# Patient Record
Sex: FEMALE | Race: WHITE | NOT HISPANIC OR LATINO | Employment: UNEMPLOYED | URBAN - METROPOLITAN AREA
[De-identification: names, ages, dates, MRNs, and addresses within clinical notes are randomized per-mention and may not be internally consistent; named-entity substitution may affect disease eponyms.]

---

## 2023-04-04 ENCOUNTER — HOSPITAL ENCOUNTER (OUTPATIENT)
Dept: DATA CONVERSION | Facility: HOSPITAL | Age: 58
End: 2023-04-04
Attending: STUDENT IN AN ORGANIZED HEALTH CARE EDUCATION/TRAINING PROGRAM | Admitting: STUDENT IN AN ORGANIZED HEALTH CARE EDUCATION/TRAINING PROGRAM
Payer: COMMERCIAL

## 2023-04-04 DIAGNOSIS — K29.70 GASTRITIS, UNSPECIFIED, WITHOUT BLEEDING: ICD-10-CM

## 2023-04-04 DIAGNOSIS — E78.5 HYPERLIPIDEMIA, UNSPECIFIED: ICD-10-CM

## 2023-04-04 DIAGNOSIS — E66.9 OBESITY, UNSPECIFIED: ICD-10-CM

## 2023-04-04 DIAGNOSIS — R12 HEARTBURN: ICD-10-CM

## 2023-04-04 DIAGNOSIS — E03.9 HYPOTHYROIDISM, UNSPECIFIED: ICD-10-CM

## 2023-04-04 DIAGNOSIS — F32.A DEPRESSION, UNSPECIFIED: ICD-10-CM

## 2023-04-04 DIAGNOSIS — K44.9 DIAPHRAGMATIC HERNIA WITHOUT OBSTRUCTION OR GANGRENE: ICD-10-CM

## 2023-04-04 DIAGNOSIS — N80.9 ENDOMETRIOSIS, UNSPECIFIED: ICD-10-CM

## 2023-04-04 DIAGNOSIS — K31.9 DISEASE OF STOMACH AND DUODENUM, UNSPECIFIED: ICD-10-CM

## 2023-04-04 DIAGNOSIS — K21.9 GASTRO-ESOPHAGEAL REFLUX DISEASE WITHOUT ESOPHAGITIS: ICD-10-CM

## 2023-04-04 DIAGNOSIS — R13.10 DYSPHAGIA, UNSPECIFIED: ICD-10-CM

## 2023-04-04 DIAGNOSIS — K31.7 POLYP OF STOMACH AND DUODENUM: ICD-10-CM

## 2023-04-04 DIAGNOSIS — Q39.9 CONGENITAL MALFORMATION OF ESOPHAGUS, UNSPECIFIED: ICD-10-CM

## 2023-04-11 LAB
COMPLETE PATHOLOGY REPORT: NORMAL
CONVERTED CLINICAL DIAGNOSIS-HISTORY: NORMAL
CONVERTED FINAL DIAGNOSIS: NORMAL
CONVERTED FINAL REPORT PDF LINK TO COPY AND PASTE: NORMAL
CONVERTED GROSS DESCRIPTION: NORMAL

## 2023-04-19 ENCOUNTER — TELEPHONE (OUTPATIENT)
Dept: PRIMARY CARE | Facility: CLINIC | Age: 58
End: 2023-04-19
Payer: COMMERCIAL

## 2023-04-19 DIAGNOSIS — Z12.31 BREAST CANCER SCREENING BY MAMMOGRAM: Primary | ICD-10-CM

## 2023-04-19 NOTE — TELEPHONE ENCOUNTER
Patient requesting Mammogram order to be faxed to [facility] they will not schedule her until order is received [phone]

## 2023-05-03 DIAGNOSIS — E78.5 HYPERLIPIDEMIA, UNSPECIFIED: ICD-10-CM

## 2023-05-03 RX ORDER — ATORVASTATIN CALCIUM 40 MG/1
TABLET, FILM COATED ORAL
Qty: 90 TABLET | Refills: 3 | Status: SHIPPED | OUTPATIENT
Start: 2023-05-03 | End: 2024-04-15

## 2023-07-10 LAB
CALCIDIOL (25 OH VITAMIN D3) (NG/ML) IN SER/PLAS: 54 NG/ML
THYROTROPIN (MIU/L) IN SER/PLAS BY DETECTION LIMIT <= 0.05 MIU/L: 0.05 MIU/L (ref 0.44–3.98)
THYROXINE (T4) FREE (NG/DL) IN SER/PLAS: 1.07 NG/DL (ref 0.61–1.12)

## 2023-08-09 LAB
ALANINE AMINOTRANSFERASE (SGPT) (U/L) IN SER/PLAS: 12 U/L (ref 7–45)
ALBUMIN (G/DL) IN SER/PLAS: 4.4 G/DL (ref 3.4–5)
ALKALINE PHOSPHATASE (U/L) IN SER/PLAS: 100 U/L (ref 33–110)
ANION GAP IN SER/PLAS: 11 MMOL/L (ref 10–20)
ASPARTATE AMINOTRANSFERASE (SGOT) (U/L) IN SER/PLAS: 16 U/L (ref 9–39)
BILIRUBIN TOTAL (MG/DL) IN SER/PLAS: 0.4 MG/DL (ref 0–1.2)
CALCIUM (MG/DL) IN SER/PLAS: 9.7 MG/DL (ref 8.6–10.3)
CARBON DIOXIDE, TOTAL (MMOL/L) IN SER/PLAS: 28 MMOL/L (ref 21–32)
CHLORIDE (MMOL/L) IN SER/PLAS: 106 MMOL/L (ref 98–107)
CREATININE (MG/DL) IN SER/PLAS: 0.87 MG/DL (ref 0.5–1.05)
GFR FEMALE: 78 ML/MIN/1.73M2
GLUCOSE (MG/DL) IN SER/PLAS: 103 MG/DL (ref 74–99)
POTASSIUM (MMOL/L) IN SER/PLAS: 4.1 MMOL/L (ref 3.5–5.3)
PROTEIN TOTAL: 7.2 G/DL (ref 6.4–8.2)
SODIUM (MMOL/L) IN SER/PLAS: 141 MMOL/L (ref 136–145)
UREA NITROGEN (MG/DL) IN SER/PLAS: 20 MG/DL (ref 6–23)

## 2023-09-06 VITALS — BODY MASS INDEX: 35.64 KG/M2 | WEIGHT: 227.07 LBS | HEIGHT: 67 IN

## 2023-11-05 PROBLEM — F33.42 MAJOR DEPRESSIVE DISORDER, RECURRENT, IN FULL REMISSION WITH ANXIOUS DISTRESS (CMS-HCC): Status: ACTIVE | Noted: 2023-11-05

## 2023-11-05 PROBLEM — J32.9 SINUSITIS: Status: ACTIVE | Noted: 2023-11-05

## 2023-11-05 PROBLEM — H52.4 BILATERAL PRESBYOPIA: Status: ACTIVE | Noted: 2023-11-05

## 2023-11-05 PROBLEM — L03.90 CELLULITIS: Status: ACTIVE | Noted: 2023-11-05

## 2023-11-05 PROBLEM — G51.4 EYELID MYOKYMIA: Status: ACTIVE | Noted: 2023-11-05

## 2023-11-05 PROBLEM — G89.29 CHRONIC MUSCULOSKELETAL PAIN: Status: ACTIVE | Noted: 2023-11-05

## 2023-11-05 PROBLEM — M19.90 OSTEOARTHRITIS: Status: ACTIVE | Noted: 2023-11-05

## 2023-11-05 PROBLEM — H66.90 OTITIS MEDIA: Status: ACTIVE | Noted: 2023-11-05

## 2023-11-05 PROBLEM — L68.0 HIRSUTISM: Status: ACTIVE | Noted: 2020-01-13

## 2023-11-05 PROBLEM — G43.909 MIGRAINE HEADACHE: Status: ACTIVE | Noted: 2023-11-05

## 2023-11-05 PROBLEM — R53.83 FATIGUE: Status: ACTIVE | Noted: 2023-11-05

## 2023-11-05 PROBLEM — G47.30 SLEEP APNEA: Status: ACTIVE | Noted: 2023-11-05

## 2023-11-05 PROBLEM — R20.2 NUMBNESS AND TINGLING: Status: ACTIVE | Noted: 2023-11-05

## 2023-11-05 PROBLEM — F42.9 OBSESSIVE-COMPULSIVE DISORDER: Status: ACTIVE | Noted: 2023-11-05

## 2023-11-05 PROBLEM — K21.9 GASTROESOPHAGEAL REFLUX DISEASE: Status: ACTIVE | Noted: 2023-11-05

## 2023-11-05 PROBLEM — M79.10 MYALGIA: Status: ACTIVE | Noted: 2023-11-05

## 2023-11-05 PROBLEM — R20.0 NUMBNESS AND TINGLING: Status: ACTIVE | Noted: 2023-11-05

## 2023-11-05 PROBLEM — F33.1 MODERATE EPISODE OF RECURRENT MAJOR DEPRESSIVE DISORDER (MULTI): Status: ACTIVE | Noted: 2023-11-05

## 2023-11-05 PROBLEM — E03.9 HYPOTHYROIDISM: Status: ACTIVE | Noted: 2023-11-05

## 2023-11-05 PROBLEM — H52.03 HYPERMETROPIA OF BOTH EYES: Status: ACTIVE | Noted: 2023-11-05

## 2023-11-05 PROBLEM — M19.049 CMC ARTHRITIS: Status: ACTIVE | Noted: 2023-11-05

## 2023-11-05 PROBLEM — E78.5 HYPERLIPIDEMIA: Status: ACTIVE | Noted: 2023-11-05

## 2023-11-05 PROBLEM — R06.83 SNORES: Status: ACTIVE | Noted: 2023-11-05

## 2023-11-05 PROBLEM — E66.9 OBESITY: Status: ACTIVE | Noted: 2023-11-05

## 2023-11-05 PROBLEM — L21.8 OTHER SEBORRHEIC DERMATITIS: Status: ACTIVE | Noted: 2020-01-13

## 2023-11-05 PROBLEM — N95.1 VASOMOTOR SYMPTOMS DUE TO MENOPAUSE: Status: ACTIVE | Noted: 2023-11-05

## 2023-11-05 PROBLEM — G25.2 DYSTONIC TREMOR: Status: ACTIVE | Noted: 2023-11-05

## 2023-11-05 PROBLEM — R25.1 TREMOR: Status: ACTIVE | Noted: 2023-11-05

## 2023-11-05 PROBLEM — M79.7 FIBROMYALGIA: Status: ACTIVE | Noted: 2023-11-05

## 2023-11-05 PROBLEM — E53.8 B12 DEFICIENCY: Status: ACTIVE | Noted: 2023-11-05

## 2023-11-05 PROBLEM — H91.91 HEARING LOSS ON RIGHT: Status: ACTIVE | Noted: 2023-11-05

## 2023-11-05 PROBLEM — G43.719 INTRACTABLE CHRONIC MIGRAINE WITHOUT AURA AND WITHOUT STATUS MIGRAINOSUS: Status: ACTIVE | Noted: 2023-11-05

## 2023-11-05 PROBLEM — H57.812 BROW PTOSIS, LEFT: Status: ACTIVE | Noted: 2023-11-05

## 2023-11-05 PROBLEM — D35.2 PITUITARY ADENOMA (MULTI): Status: ACTIVE | Noted: 2023-11-05

## 2023-11-05 PROBLEM — F33.8 SEASONAL AFFECTIVE DISORDER (CMS-HCC): Status: ACTIVE | Noted: 2023-11-05

## 2023-11-05 PROBLEM — M12.9 ARTHROPATHY: Status: ACTIVE | Noted: 2023-11-05

## 2023-11-05 PROBLEM — R06.09 DYSPNEA ON EXERTION: Status: ACTIVE | Noted: 2023-11-05

## 2023-11-05 PROBLEM — E55.9 VITAMIN D DEFICIENCY: Status: ACTIVE | Noted: 2023-11-05

## 2023-11-05 PROBLEM — R21 RASH: Status: ACTIVE | Noted: 2023-11-05

## 2023-11-05 PROBLEM — F41.8 ANXIETY WITH SOMATIC FEATURES: Status: ACTIVE | Noted: 2023-11-05

## 2023-11-05 PROBLEM — M79.18 CHRONIC MUSCULOSKELETAL PAIN: Status: ACTIVE | Noted: 2023-11-05

## 2023-11-05 RX ORDER — ASPIRIN 325 MG
1 TABLET, DELAYED RELEASE (ENTERIC COATED) ORAL WEEKLY
COMMUNITY
Start: 2022-02-01 | End: 2023-11-17 | Stop reason: ALTCHOICE

## 2023-11-05 RX ORDER — CICLOPIROX 1 G/100ML
1 SHAMPOO TOPICAL
COMMUNITY
Start: 2018-04-09 | End: 2023-11-17 | Stop reason: ALTCHOICE

## 2023-11-05 RX ORDER — UBROGEPANT 100 MG/1
1 TABLET ORAL ONCE AS NEEDED
COMMUNITY
Start: 2023-03-06 | End: 2023-11-17 | Stop reason: ALTCHOICE

## 2023-11-05 RX ORDER — CEFDINIR 300 MG/1
1 CAPSULE ORAL 2 TIMES DAILY
COMMUNITY
Start: 2022-11-08 | End: 2023-11-17 | Stop reason: ALTCHOICE

## 2023-11-05 RX ORDER — LAMOTRIGINE 200 MG/1
1 TABLET ORAL DAILY
COMMUNITY
Start: 2023-05-02 | End: 2024-02-21 | Stop reason: SDUPTHER

## 2023-11-05 RX ORDER — CEFUROXIME AXETIL 250 MG/1
1 TABLET ORAL DAILY
COMMUNITY
Start: 2018-04-09 | End: 2023-11-17 | Stop reason: ALTCHOICE

## 2023-11-05 RX ORDER — METHYLPREDNISOLONE 4 MG/1
TABLET ORAL
COMMUNITY
Start: 2022-09-09 | End: 2023-11-17 | Stop reason: ALTCHOICE

## 2023-11-05 RX ORDER — SERTRALINE HYDROCHLORIDE 100 MG/1
2 TABLET, FILM COATED ORAL DAILY
COMMUNITY
End: 2024-02-21 | Stop reason: SDUPTHER

## 2023-11-05 RX ORDER — ZOLMITRIPTAN 5 MG/1
1 TABLET, ORALLY DISINTEGRATING ORAL ONCE AS NEEDED
COMMUNITY
Start: 2022-08-17 | End: 2023-11-17 | Stop reason: ALTCHOICE

## 2023-11-05 RX ORDER — LEVOTHYROXINE SODIUM 137 UG/1
1 TABLET ORAL DAILY
COMMUNITY
Start: 2021-06-22 | End: 2024-01-23 | Stop reason: SDUPTHER

## 2023-11-05 RX ORDER — MELOXICAM 15 MG/1
1 TABLET ORAL DAILY PRN
COMMUNITY
Start: 2022-04-07 | End: 2023-11-20

## 2023-11-05 RX ORDER — OMEPRAZOLE 20 MG/1
1 CAPSULE, DELAYED RELEASE ORAL DAILY
COMMUNITY

## 2023-11-05 RX ORDER — PROPRANOLOL HYDROCHLORIDE 60 MG/1
1 CAPSULE, EXTENDED RELEASE ORAL DAILY
COMMUNITY
Start: 2023-02-14 | End: 2023-11-17 | Stop reason: ALTCHOICE

## 2023-11-06 ENCOUNTER — OFFICE VISIT (OUTPATIENT)
Dept: ORTHOPEDIC SURGERY | Facility: CLINIC | Age: 58
End: 2023-11-06
Payer: COMMERCIAL

## 2023-11-06 ENCOUNTER — SPECIALTY PHARMACY (OUTPATIENT)
Dept: PHARMACY | Facility: CLINIC | Age: 58
End: 2023-11-06

## 2023-11-06 ENCOUNTER — PHARMACY VISIT (OUTPATIENT)
Dept: PHARMACY | Facility: CLINIC | Age: 58
End: 2023-11-06
Payer: COMMERCIAL

## 2023-11-06 DIAGNOSIS — M19.049 CMC ARTHRITIS: Primary | ICD-10-CM

## 2023-11-06 PROCEDURE — 3008F BODY MASS INDEX DOCD: CPT | Performed by: ORTHOPAEDIC SURGERY

## 2023-11-06 PROCEDURE — 99203 OFFICE O/P NEW LOW 30 MIN: CPT | Performed by: ORTHOPAEDIC SURGERY

## 2023-11-06 PROCEDURE — 20600 DRAIN/INJ JOINT/BURSA W/O US: CPT | Performed by: ORTHOPAEDIC SURGERY

## 2023-11-06 PROCEDURE — RXMED WILLOW AMBULATORY MEDICATION CHARGE

## 2023-11-06 RX ORDER — LIDOCAINE HYDROCHLORIDE 10 MG/ML
0.5 INJECTION INFILTRATION; PERINEURAL
Status: COMPLETED | OUTPATIENT
Start: 2023-11-06 | End: 2023-11-06

## 2023-11-06 RX ORDER — TRIAMCINOLONE ACETONIDE 40 MG/ML
20 INJECTION, SUSPENSION INTRA-ARTICULAR; INTRAMUSCULAR
Status: COMPLETED | OUTPATIENT
Start: 2023-11-06 | End: 2023-11-06

## 2023-11-06 RX ADMIN — TRIAMCINOLONE ACETONIDE 20 MG: 40 INJECTION, SUSPENSION INTRA-ARTICULAR; INTRAMUSCULAR at 18:41

## 2023-11-06 RX ADMIN — LIDOCAINE HYDROCHLORIDE 0.5 ML: 10 INJECTION INFILTRATION; PERINEURAL at 18:41

## 2023-11-06 NOTE — PROGRESS NOTES
57-year-old retired right-hand-dominant female presents today for evaluation of bilateral thumb pain.  She has previously been treated by Dr. Tinsley in [place].  Work-up has included x-rays.  She was advised that he does not perform steroid injections for this problem and he recommended surgery.  He took her to the operating room in February 2022.  She was anticipating a thumb CMC arthroplasty procedure but indicates that after surgery she was advised that all that was done was an arthrotomy with removal of free-floating loose bodies from her arthritic left thumb CMC joint.  This did not change any of her symptoms.  She is additionally used Mobic, CBD, THC, topical pain relievers and splints.  She has not had any injections for this problem.  She is starting to have similar but much less severe symptoms on her right thumb as well.    Past Medical History:   Diagnosis Date    Bipolar II disorder (CMS/[facility]) 02/01/2017    Bipolar 2 disorder    Encounter for general adult medical examination without abnormal findings 10/19/2014    Preventative health care    Major depressive disorder, recurrent, in partial remission (CMS/[facility]) 02/17/2021    Major depressive disorder, recurrent episode, in partial remission with anxious distress    Major depressive disorder, recurrent, mild (CMS/[facility]) 02/01/2017    Major depressive disorder, recurrent episode, mild with anxious distress    Personal history of other mental and behavioral disorders 02/03/2017    History of depression    Personal history of other mental and behavioral disorders 02/01/2017    History of bipolar disorder    Personal history of other specified conditions     History of headache     Past Surgical History:   Procedure Laterality Date    OTHER SURGICAL HISTORY  09/12/2014    Incision Of Pituitary Gland Aspiration Rathke's Pouch    TOTAL VAGINAL HYSTERECTOMY  09/12/2014    Vaginal Hysterectomy     Current Outpatient Medications on File Prior to Visit   Medication Sig  Dispense Refill    atorvastatin (Lipitor) 40 mg tablet TAKE 1 TABLET BY MOUTH EVERYDAY AT BEDTIME 90 tablet 3    cefdinir (Omnicef) 300 mg capsule Take 1 capsule (300 mg) by mouth 2 times a day.      cefuroxime (Ceftin) 250 mg tablet Take 1 tablet (250 mg) by mouth once daily.      cholecalciferol (Vitamin D-3) 50,000 unit capsule Take 1 capsule (50,000 Units) by mouth once a week.      ciclopirox 1 % shampoo Apply 1 Application topically. As directed.      galcanezumab (Emgality) 120 mg/mL auto-injector INJECT 120MG (1 PEN) UNDER THE SKIN ONCE MONTHLY. 3 mL 3    lamoTRIgine (LaMICtal) 200 mg tablet Take 1 tablet (200 mg) by mouth once daily.      levothyroxine (Synthroid, Levoxyl) 137 mcg tablet Take 1 tablet (137 mcg) by mouth once daily.      meloxicam (Mobic) 15 mg tablet Take 1 tablet (15 mg) by mouth once daily as needed for mild pain (1 - 3). With food      methylPREDNISolone (Medrol Dospak) 4 mg tablets Take by mouth. TAKE PER DIRECTIONS      omeprazole (PriLOSEC) 20 mg DR capsule Take 1 capsule (20 mg) by mouth once daily.      onabotulinumtoxinA (Botox) 100 unit injection PROVIDER TO INJECT A TOTAL  UNITS INTRAMUSCULARLY ONCE EVERY 90 DAYS. 2 each 3    propranolol LA (Inderal LA) 60 mg 24 hr capsule Take 1 capsule (60 mg) by mouth once daily.      sertraline (Zoloft) 100 mg tablet Take 2 tablets (200 mg) by mouth once daily.      Ubrelvy 100 mg tablet tablet Take 1 tablet (100 mg) by mouth 1 time if needed.      ZOLMitriptan (Zomig-ZMT) 5 mg disintegrating tablet Take 1 tablet (5 mg) by mouth 1 time if needed for migraine.       No current facility-administered medications on file prior to visit.     Allergies   Allergen Reactions    Bee Pollen Itching     Physical Examination Findings:  Constitutional: Appears well-developed and well-nourished.  Head: Normocephalic and atraumatic.  Eyes: Pupils are equal and round.  Cardiovascular: Intact distal pulses.   Respiratory: Effort normal. No respiratory  distress.  Neurologic: Alert and oriented to person, place, and time.  Skin: Skin is warm and dry.  Hematologic / Lympahtic: No lymphedema, lymphangitis.  Psychiatric: normal mood and affect. Behavior is normal.   Musculoskeletal: Examination of the bilateral upper extremities reveals healed surgical incision extending proximally from the left thumb metacarpal base.  She has markedly positive CMC grind test bilaterally.  No MP joint hyperextension stability.  No thenar or intrinsic atrophy.  Sensation subjectively normal.    Review of x-rays of the bilateral thumbs from February 2020 to demonstrate arthritic changes at the CMC joint more significant on the left side.    Impression: Bilateral thumb CMC arthritis.    Plan: We have discussed this diagnosis.  She has clear advanced arthritic changes at her thumb CMC joints bilaterally.  I am not sure what was expected with a simple removal of loose body.  I believe steroid injections are an appropriate treatment option for this condition.  She has done all of the other attempted nonoperative management schemes without relief.  She has agreed to proceed with injections today.    S Inj/Asp: bilateral thumb CMC on 11/6/2023 6:41 PM  Indications: pain  Details: 25 G needle, dorsal approach  Medications (Right): 20 mg triamcinolone acetonide 40 mg/mL; 0.5 mL lidocaine 10 mg/mL (1 %)  Medications (Left): 20 mg triamcinolone acetonide 40 mg/mL; 0.5 mL lidocaine 10 mg/mL (1 %)  Outcome: tolerated well, no immediate complications  Procedure, treatment alternatives, risks and benefits explained, specific risks discussed. Consent was given by the patient. Immediately prior to procedure a time out was called to verify the correct patient, procedure, equipment, support staff and site/side marked as required. Patient was prepped and draped in the usual sterile fashion.         Return as needed for recurrence or progression of problems .    Sammy Felix MD  Associate    [facility] [employer]  Department of Orthopaedic Surgery  Chief of Hand and Upper Extremity Surgery  [facility]    Dictation performed with the use of voice recognition software. Syntax and grammatical errors may exist.

## 2023-11-06 NOTE — LETTER
November 6, 2023     Gertrude Wetzel MD PhD  [address]  [facility], [address]  [facility] [address]    Patient: Rachel Gomez   YOB: 1965   Date of Visit: 11/6/2023       Dear Dr. Gertrude Wetzel MD PhD:    Thank you for referring Rachel Gomez to me for evaluation. Below are my notes for this consultation.  If you have questions, please do not hesitate to call me. I look forward to following your patient along with you.       Sincerely,     Sammy Felix MD      CC: No Recipients  ______________________________________________________________________________________    57-year-old retired right-hand-dominant female presents today for evaluation of bilateral thumb pain.  She has previously been treated by Dr. Tinsley in [place].  Work-up has included x-rays.  She was advised that he does not perform steroid injections for this problem and he recommended surgery.  He took her to the operating room in February 2022.  She was anticipating a thumb CMC arthroplasty procedure but indicates that after surgery she was advised that all that was done was an arthrotomy with removal of free-floating loose bodies from her arthritic left thumb CMC joint.  This did not change any of her symptoms.  She is additionally used Mobic, CBD, THC, topical pain relievers and splints.  She has not had any injections for this problem.  She is starting to have similar but much less severe symptoms on her right thumb as well.    Past Medical History:   Diagnosis Date   • Bipolar II disorder (CMS/[facility]) 02/01/2017    Bipolar 2 disorder   • Encounter for general adult medical examination without abnormal findings 10/19/2014    Preventative health care   • Major depressive disorder, recurrent, in partial remission (CMS/[facility]) 02/17/2021    Major depressive disorder, recurrent episode, in partial remission with anxious distress   • Major depressive disorder, recurrent, mild (CMS/[facility]) 02/01/2017    Major  depressive disorder, recurrent episode, mild with anxious distress   • Personal history of other mental and behavioral disorders 02/03/2017    History of depression   • Personal history of other mental and behavioral disorders 02/01/2017    History of bipolar disorder   • Personal history of other specified conditions     History of headache     Past Surgical History:   Procedure Laterality Date   • OTHER SURGICAL HISTORY  09/12/2014    Incision Of Pituitary Gland Aspiration Rathke's Pouch   • TOTAL VAGINAL HYSTERECTOMY  09/12/2014    Vaginal Hysterectomy     Current Outpatient Medications on File Prior to Visit   Medication Sig Dispense Refill   • atorvastatin (Lipitor) 40 mg tablet TAKE 1 TABLET BY MOUTH EVERYDAY AT BEDTIME 90 tablet 3   • cefdinir (Omnicef) 300 mg capsule Take 1 capsule (300 mg) by mouth 2 times a day.     • cefuroxime (Ceftin) 250 mg tablet Take 1 tablet (250 mg) by mouth once daily.     • cholecalciferol (Vitamin D-3) 50,000 unit capsule Take 1 capsule (50,000 Units) by mouth once a week.     • ciclopirox 1 % shampoo Apply 1 Application topically. As directed.     • galcanezumab (Emgality) 120 mg/mL auto-injector INJECT 120MG (1 PEN) UNDER THE SKIN ONCE MONTHLY. 3 mL 3   • lamoTRIgine (LaMICtal) 200 mg tablet Take 1 tablet (200 mg) by mouth once daily.     • levothyroxine (Synthroid, Levoxyl) 137 mcg tablet Take 1 tablet (137 mcg) by mouth once daily.     • meloxicam (Mobic) 15 mg tablet Take 1 tablet (15 mg) by mouth once daily as needed for mild pain (1 - 3). With food     • methylPREDNISolone (Medrol Dospak) 4 mg tablets Take by mouth. TAKE PER DIRECTIONS     • omeprazole (PriLOSEC) 20 mg DR capsule Take 1 capsule (20 mg) by mouth once daily.     • onabotulinumtoxinA (Botox) 100 unit injection PROVIDER TO INJECT A TOTAL  UNITS INTRAMUSCULARLY ONCE EVERY 90 DAYS. 2 each 3   • propranolol LA (Inderal LA) 60 mg 24 hr capsule Take 1 capsule (60 mg) by mouth once daily.     • sertraline  (Zoloft) 100 mg tablet Take 2 tablets (200 mg) by mouth once daily.     • Ubrelvy 100 mg tablet tablet Take 1 tablet (100 mg) by mouth 1 time if needed.     • ZOLMitriptan (Zomig-ZMT) 5 mg disintegrating tablet Take 1 tablet (5 mg) by mouth 1 time if needed for migraine.       No current facility-administered medications on file prior to visit.     Allergies   Allergen Reactions   • Bee Pollen Itching     Physical Examination Findings:  Constitutional: Appears well-developed and well-nourished.  Head: Normocephalic and atraumatic.  Eyes: Pupils are equal and round.  Cardiovascular: Intact distal pulses.   Respiratory: Effort normal. No respiratory distress.  Neurologic: Alert and oriented to person, place, and time.  Skin: Skin is warm and dry.  Hematologic / Lympahtic: No lymphedema, lymphangitis.  Psychiatric: normal mood and affect. Behavior is normal.   Musculoskeletal: Examination of the bilateral upper extremities reveals healed surgical incision extending proximally from the left thumb metacarpal base.  She has markedly positive CMC grind test bilaterally.  No MP joint hyperextension stability.  No thenar or intrinsic atrophy.  Sensation subjectively normal.    Review of x-rays of the bilateral thumbs from February 2020 to demonstrate arthritic changes at the CMC joint more significant on the left side.    Impression: Bilateral thumb CMC arthritis.    Plan: We have discussed this diagnosis.  She has clear advanced arthritic changes at her thumb CMC joints bilaterally.  I am not sure what was expected with a simple removal of loose body.  I believe steroid injections are an appropriate treatment option for this condition.  She has done all of the other attempted nonoperative management schemes without relief.  She has agreed to proceed with injections today.    S Inj/Asp: bilateral thumb CMC on 11/6/2023 6:41 PM  Indications: pain  Details: 25 G needle, dorsal approach  Medications (Right): 20 mg triamcinolone  acetonide 40 mg/mL; 0.5 mL lidocaine 10 mg/mL (1 %)  Medications (Left): 20 mg triamcinolone acetonide 40 mg/mL; 0.5 mL lidocaine 10 mg/mL (1 %)  Outcome: tolerated well, no immediate complications  Procedure, treatment alternatives, risks and benefits explained, specific risks discussed. Consent was given by the patient. Immediately prior to procedure a time out was called to verify the correct patient, procedure, equipment, support staff and site/side marked as required. Patient was prepped and draped in the usual sterile fashion.         Return as needed for recurrence or progression of problems .    Sammy Felix MD    [facility] [employer]  Department of Orthopaedic Surgery  Chief of Hand and Upper Extremity Surgery  [facility]    Dictation performed with the use of voice recognition software. Syntax and grammatical errors may exist.

## 2023-11-08 ENCOUNTER — TELEPHONE (OUTPATIENT)
Dept: PRIMARY CARE | Facility: CLINIC | Age: 58
End: 2023-11-08

## 2023-11-08 ENCOUNTER — PROCEDURE VISIT (OUTPATIENT)
Dept: NEUROLOGY | Facility: CLINIC | Age: 58
End: 2023-11-08
Payer: COMMERCIAL

## 2023-11-08 DIAGNOSIS — G43.719 INTRACTABLE CHRONIC MIGRAINE WITHOUT AURA AND WITHOUT STATUS MIGRAINOSUS: Primary | ICD-10-CM

## 2023-11-08 PROCEDURE — 64615 CHEMODENERV MUSC MIGRAINE: CPT | Performed by: PSYCHIATRY & NEUROLOGY

## 2023-11-08 NOTE — PROGRESS NOTES
PROCEDURE NOTE:    58 y/o F here for repeat Botox injection for migraines.   Her headaches were worsening with weather changes and certain dietary changes.      Botox typically in the past lasts 2 1/2 months, only had a couple headaches during that time, the past 2 weeks has had more HAs, stronger. she is happy with the response.      used to be followed by Sarahy Morrison (NP) and Dr. Castro at [facility]     hx prolactinoma s/p TSS 10 years ago at [facility], sees endocrine.     Onset of headaches: 40+ years  Frequency of headaches (days per month): without botox - usually 20 days/ month  Duration of headaches (average): All day long.  Severity of headaches (out of 10): 5-7/10  Aura: none , wakes up with HA  Nausea/vomiting: Yes - nausea  Photophobia: Yes  Phonophobia: Yes     Location: Across forehead to temple area.     triggers:Weather changes, foods, not eating correctly     brain imaging: MRI performed regularly ([facility]) due to hx of pituitary tumor/removed surgically 2010 at [facility]. - her endocrinologist just ordered MRI sella.      Preventative medications:   Topiramate  Zonisamide  Buproprion  ubrelvy  emgality       Botox (4 rounds)- in 2018 with 200 units, last one being 12/4/2018- stopped due to insurance.   She has been to the ER in the past and received a headache cocktail.      Abortive medications: Dexamethasone 4 mg (during a cycle)    Zomig nasal spray.   Rizatriptan.      Personally reviewed with patient- accurate for below - Past Medical, Surgical and Family and Social histories        Procedure        The patient was prepped in the usual sterile fashion. OnabotulinumtoxinA (Botox) was injected as follows:     Total dose       Sites                      Muscle     1.       25 units  4 sites                   Frontalis muscle   2. 10 units        2 sites                    muscle  3.       5 units    1 site                     Procerus muscle  4.       40 units  6 sites                   Occipitalis  muscle  5.       55 units  6 sites                   Temporalis muscle (more anterior)   6.       30 units  6 sites                   Trapezius muscle  7.       20 units  4 sites                   Cervical paraspinal (bilateral)     Total amount of botulinum toxin used was 185 units.  BOTOX IS PATIENT SUPPLIED      Diagnosis: G43.719      The procedure was well tolerated. The patient will return in approximately three months for repeat injection.

## 2023-11-08 NOTE — TELEPHONE ENCOUNTER
Pt of Dr Randolph's. Calling in regards to an appt she has next week and was wanting to advise if she needs labs done. Please advise. Thank you!

## 2023-11-17 ENCOUNTER — OFFICE VISIT (OUTPATIENT)
Dept: RHEUMATOLOGY | Facility: CLINIC | Age: 58
End: 2023-11-17
Payer: COMMERCIAL

## 2023-11-17 ENCOUNTER — LAB (OUTPATIENT)
Dept: LAB | Facility: LAB | Age: 58
End: 2023-11-17
Payer: COMMERCIAL

## 2023-11-17 VITALS
DIASTOLIC BLOOD PRESSURE: 68 MMHG | SYSTOLIC BLOOD PRESSURE: 112 MMHG | BODY MASS INDEX: 36.63 KG/M2 | HEIGHT: 67 IN | WEIGHT: 233.4 LBS | TEMPERATURE: 97.4 F | HEART RATE: 62 BPM | OXYGEN SATURATION: 97 %

## 2023-11-17 DIAGNOSIS — R35.0 URINARY FREQUENCY: ICD-10-CM

## 2023-11-17 DIAGNOSIS — M19.90 UNSPECIFIED OSTEOARTHRITIS, UNSPECIFIED SITE: ICD-10-CM

## 2023-11-17 DIAGNOSIS — M19.049 CMC ARTHRITIS: ICD-10-CM

## 2023-11-17 DIAGNOSIS — M15.9 OSTEOARTHRITIS OF MULTIPLE JOINTS, UNSPECIFIED OSTEOARTHRITIS TYPE: Primary | ICD-10-CM

## 2023-11-17 PROBLEM — M54.12 RADICULOPATHY, CERVICAL REGION: Status: ACTIVE | Noted: 2018-04-23

## 2023-11-17 PROBLEM — R20.0 ANESTHESIA OF SKIN: Status: ACTIVE | Noted: 2018-03-14

## 2023-11-17 PROBLEM — J30.2 SEASONAL ALLERGIES: Status: ACTIVE | Noted: 2018-04-29

## 2023-11-17 PROBLEM — L72.0 EPIDERMOID CYST OF SKIN: Status: ACTIVE | Noted: 2018-04-23

## 2023-11-17 PROBLEM — L71.9 ROSACEA: Status: ACTIVE | Noted: 2018-04-23

## 2023-11-17 PROBLEM — I10 ESSENTIAL (PRIMARY) HYPERTENSION: Status: ACTIVE | Noted: 2018-04-23

## 2023-11-17 LAB
APPEARANCE UR: CLEAR
BACTERIA #/AREA URNS AUTO: ABNORMAL /HPF
BILIRUB UR STRIP.AUTO-MCNC: ABNORMAL MG/DL
CAOX CRY #/AREA UR COMP ASSIST: ABNORMAL /HPF
COLOR UR: ABNORMAL
GLUCOSE UR STRIP.AUTO-MCNC: NEGATIVE MG/DL
KETONES UR STRIP.AUTO-MCNC: ABNORMAL MG/DL
LEUKOCYTE ESTERASE UR QL STRIP.AUTO: ABNORMAL
MUCOUS THREADS #/AREA URNS AUTO: ABNORMAL /LPF
NITRITE UR QL STRIP.AUTO: NEGATIVE
PH UR STRIP.AUTO: 5 [PH]
PROT UR STRIP.AUTO-MCNC: ABNORMAL MG/DL
RBC # UR STRIP.AUTO: NEGATIVE /UL
RBC #/AREA URNS AUTO: ABNORMAL /HPF
SP GR UR STRIP.AUTO: 1.03
SQUAMOUS #/AREA URNS AUTO: ABNORMAL /HPF
UROBILINOGEN UR STRIP.AUTO-MCNC: 2 MG/DL
WBC #/AREA URNS AUTO: ABNORMAL /HPF

## 2023-11-17 PROCEDURE — 3008F BODY MASS INDEX DOCD: CPT | Performed by: INTERNAL MEDICINE

## 2023-11-17 PROCEDURE — 3074F SYST BP LT 130 MM HG: CPT | Performed by: INTERNAL MEDICINE

## 2023-11-17 PROCEDURE — 3078F DIAST BP <80 MM HG: CPT | Performed by: INTERNAL MEDICINE

## 2023-11-17 PROCEDURE — 81001 URINALYSIS AUTO W/SCOPE: CPT

## 2023-11-17 PROCEDURE — 1036F TOBACCO NON-USER: CPT | Performed by: INTERNAL MEDICINE

## 2023-11-17 PROCEDURE — 99214 OFFICE O/P EST MOD 30 MIN: CPT | Performed by: INTERNAL MEDICINE

## 2023-11-17 RX ORDER — ZONISAMIDE 100 MG/1
300 CAPSULE ORAL
COMMUNITY
Start: 2019-04-29 | End: 2023-11-17 | Stop reason: ALTCHOICE

## 2023-11-17 RX ORDER — KETOROLAC TROMETHAMINE 10 MG/1
10 TABLET, FILM COATED ORAL EVERY 6 HOURS PRN
COMMUNITY
Start: 2018-09-12 | End: 2023-11-17 | Stop reason: ALTCHOICE

## 2023-11-17 RX ORDER — MAGNESIUM 200 MG
1 TABLET ORAL
COMMUNITY
End: 2023-11-17 | Stop reason: ALTCHOICE

## 2023-11-17 RX ORDER — CHLORZOXAZONE 500 MG/1
500 TABLET ORAL 4 TIMES DAILY
COMMUNITY
Start: 2018-09-04 | End: 2023-11-17 | Stop reason: ALTCHOICE

## 2023-11-17 RX ORDER — TOPIRAMATE 100 MG/1
100 TABLET, FILM COATED ORAL DAILY
COMMUNITY
Start: 2015-05-21 | End: 2023-11-17 | Stop reason: ALTCHOICE

## 2023-11-17 RX ORDER — ERGOCALCIFEROL 1.25 MG/1
50000 CAPSULE ORAL
COMMUNITY
Start: 2020-03-19

## 2023-11-17 RX ORDER — ESTRADIOL 1 MG/1
1 TABLET ORAL
COMMUNITY
Start: 2019-05-06 | End: 2023-11-17 | Stop reason: ALTCHOICE

## 2023-11-17 ASSESSMENT — ENCOUNTER SYMPTOMS
OCCASIONAL FEELINGS OF UNSTEADINESS: 0
DEPRESSION: 0
LOSS OF SENSATION IN FEET: 0

## 2023-11-17 ASSESSMENT — PATIENT HEALTH QUESTIONNAIRE - PHQ9
SUM OF ALL RESPONSES TO PHQ9 QUESTIONS 1 AND 2: 0
2. FEELING DOWN, DEPRESSED OR HOPELESS: NOT AT ALL
1. LITTLE INTEREST OR PLEASURE IN DOING THINGS: NOT AT ALL

## 2023-11-17 NOTE — PROGRESS NOTES
Subjective   Patient ID: Rachel Gomez is a 57 y.o. female who presents for Follow-up.    HPI 57-year-old right-handed female here for follow-up regarding OA of the first CMC joint of both hands.    She has a history of OA of the first CMC joint of both hands, left greater than right.  She had surgery on the left thumb basilar joint February 2022 by Dr. Karan Tinsley.  Procedure consisted of arthrotomy, biopsy of joint capsule, removal 2 loose bodies, excision of osteophyte/bone spur/bone cyst.     She still has persistent pain in the area.  She started meloxicam 15 mg daily April 2022 because Advil was not working.  She also takes omeprazole 20 mg daily for GERD, which is currently well controlled.  She tried to wean the meloxicam, but pain worsened.  She does get some definite relief from the meloxicam.    She saw Dr. Felix for second opinion November 6, 2023.  He gave her a corticosteroid injection of both first CMC joints, which did give her some relief.  He told her she can do this every 3 months, but he is trying to defer surgery at present.    She was previously told she may need a second surgical procedure, which involves a tendon transposition, for the left first CMC joint.     She had EGD April 2023 which showed small to medium size hiatal hernia, tortuous esophagus, gastritis, multiple gastric polyps.  Biopsies did not show any evidence of Veloz's esophagus.     Labs 9/20: ALFONZO negative, rheumatoid factor negative, citrulline antibody negative, ESR 25, CRP 0.88 (normal less than 1), uric acid 4.8  Labs December 2021: TSH 0.38  Labs 2/22: Hemoglobin 11.7, hematocrit 37.6, WBC 5.5, platelets 203, CMP normal, 25-hydroxy vitamin D 27  Labs February 2023: ALFONZO negative, rheumatoid factor negative, Citrulline antibody negative, ESR 29, CRP 0.54, 25-hydroxy vitamin D 66, cholesterol 166, HDL 58, LDL 85, triglycerides 112  Labs July 2023: 25-hydroxy vitamin D 66  Labs August 2023: CMP normal except glucose  "103     Medical problem list:   -Migraine headaches   -Bipolar disorder    -Hypothyroidism   -Hyperlipidemia   - GERD     Medications: Reviewed as documented  Surgeries: Vaginal hysterectomy    Resection of pituitary macroadenoma 2012     Allergies: Reviewed as documented     Social history: .   Denies use of tobacco or alcohol.   Occupation: Homemaker.     Family history: Mother-arthritis    ROS:  General: Denies fevers or chills.  CV: Denies chest pain or palpitations.  Denies leg edema.  Lungs: Denies coughing or shortness of breath.  Skin: Denies rashes or nodules.  MS: Denies joint pain or joint swelling.   : She complains of urinary frequency.  She goes about 12 times during the day, and she needs to get up 2-3 times at night to urinate.  She does not drink much fluid in the evening.  She drinks 1 cup of caffeinated coffee in the morning.    Objective   /68   Pulse 62   Temp 36.3 °C (97.4 °F)   Ht 1.689 m (5' 6.5\")   Wt 106 kg (233 lb 6.4 oz)   SpO2 97%   BMI 37.11 kg/m²     Physical Exam  HEENT: PERRL, EOMI  Neck: Supple, no nodes.  CV: RRR, no MGR.  Lungs: Clear, no rales or wheezes.  Abdomen: Soft, nontender. No hepatosplenomegaly.  Extremities:  No cyanosis, clubbing, or edema.  MS: No synovitis.  Bony prominence of first CMC joint bilaterally, consistent with OA.  Skin: No rashes or nodules.      Assessment/Plan   Problem List Items Addressed This Visit             ICD-10-CM    CMC arthritis M19.049    Osteoarthritis - Primary M19.90       OA left 1st CMC - s/p surgical procedure February 2022 consisting of arthrotomy, biopsy of joint capsule, removal 2 loose bodies, excision of osteophyte/bone spur/bone cyst over the dorsal base of first metacarpal of the left thumb. She has had persistent pain since the procedure. She was not getting relief with Advil. Meloxicam does help.  She had injection of first CMC joint bilaterally November 6, 2023 by Dr. Felix, which also helped.  She has " been told these can be reinjected every 3 months .    GERD-well-controlled with omeprazole.    Urinary frequency-urinalysis ordered.  If this is unremarkable, I would consider treatment for hyperactive bladder.    Plan:  Check urinalysis. Consider treatment for hyperactive bladder.  Check labs 2/24: CMP.  Follow-up in 6 months.

## 2023-11-17 NOTE — PATIENT INSTRUCTIONS
Check urinalysis. Consider treatment for hyperactive bladder.  Check labs 2/24: CMP.  Follow-up in 6 months.

## 2023-11-19 DIAGNOSIS — N39.0 URINARY TRACT INFECTION WITHOUT HEMATURIA, SITE UNSPECIFIED: Primary | ICD-10-CM

## 2023-11-19 RX ORDER — NITROFURANTOIN 25; 75 MG/1; MG/1
100 CAPSULE ORAL 2 TIMES DAILY
Qty: 10 CAPSULE | Refills: 0 | Status: SHIPPED | OUTPATIENT
Start: 2023-11-19 | End: 2023-11-24

## 2023-11-20 RX ORDER — MELOXICAM 15 MG/1
15 TABLET ORAL DAILY PRN
Qty: 90 TABLET | Refills: 1 | Status: SHIPPED | OUTPATIENT
Start: 2023-11-20 | End: 2024-05-21

## 2023-11-28 ENCOUNTER — PHARMACY VISIT (OUTPATIENT)
Dept: PHARMACY | Facility: CLINIC | Age: 58
End: 2023-11-28
Payer: COMMERCIAL

## 2023-11-28 ENCOUNTER — SPECIALTY PHARMACY (OUTPATIENT)
Dept: PHARMACY | Facility: CLINIC | Age: 58
End: 2023-11-28

## 2023-11-28 PROCEDURE — RXMED WILLOW AMBULATORY MEDICATION CHARGE

## 2023-12-05 ENCOUNTER — OFFICE VISIT (OUTPATIENT)
Dept: OBSTETRICS AND GYNECOLOGY | Facility: CLINIC | Age: 58
End: 2023-12-05
Payer: COMMERCIAL

## 2023-12-05 VITALS
DIASTOLIC BLOOD PRESSURE: 76 MMHG | WEIGHT: 233 LBS | BODY MASS INDEX: 36.57 KG/M2 | SYSTOLIC BLOOD PRESSURE: 128 MMHG | HEIGHT: 67 IN

## 2023-12-05 DIAGNOSIS — Z01.419 WELL WOMAN EXAM: Primary | ICD-10-CM

## 2023-12-05 DIAGNOSIS — R39.9 UTI SYMPTOMS: ICD-10-CM

## 2023-12-05 PROCEDURE — 3078F DIAST BP <80 MM HG: CPT | Performed by: OBSTETRICS & GYNECOLOGY

## 2023-12-05 PROCEDURE — 1036F TOBACCO NON-USER: CPT | Performed by: OBSTETRICS & GYNECOLOGY

## 2023-12-05 PROCEDURE — 3074F SYST BP LT 130 MM HG: CPT | Performed by: OBSTETRICS & GYNECOLOGY

## 2023-12-05 PROCEDURE — 99396 PREV VISIT EST AGE 40-64: CPT | Performed by: OBSTETRICS & GYNECOLOGY

## 2023-12-05 PROCEDURE — 3008F BODY MASS INDEX DOCD: CPT | Performed by: OBSTETRICS & GYNECOLOGY

## 2023-12-05 PROCEDURE — 87086 URINE CULTURE/COLONY COUNT: CPT

## 2023-12-05 ASSESSMENT — ENCOUNTER SYMPTOMS
DYSURIA: 0
FATIGUE: 0
APPETITE CHANGE: 0
FLANK PAIN: 0
VOMITING: 0
CONSTIPATION: 0
CHILLS: 0
SLEEP DISTURBANCE: 0
BLOOD IN STOOL: 0
HEMATURIA: 0
ABDOMINAL PAIN: 0
FREQUENCY: 0
BACK PAIN: 0
FEVER: 0
NAUSEA: 0
UNEXPECTED WEIGHT CHANGE: 0
ABDOMINAL DISTENTION: 0
COLOR CHANGE: 0
DIARRHEA: 0
SHORTNESS OF BREATH: 0

## 2023-12-05 ASSESSMENT — PAIN SCALES - GENERAL: PAINLEVEL: 0-NO PAIN

## 2023-12-05 NOTE — PROGRESS NOTES
History Of Present Illness  Routine Gyn Exam  Rachle JOZEF Gomez here for routine WWE.  Pt is postmenopausal.  Denies spotting or bleeding.   S/p hysterectomy 15+ years ago.     Current complaints: UTI symptoms, recently treated and unsure if resolved; cont to have hot flashes.     New health issues or surgeries since last visit: hand surgery for arthritis .  Exercise: irregular.     Gynecologic History  Postmenopausal.  Sexually active: Yes with one male partner/ .  Last Pap: no longer needed d/t hysterectomy .   Last mammogram: .   Last Colonoscopy:  Cologuard .   Last DEXA: not yet ordered.     Obstetric History  OB History    Para Term  AB Living   3 3       3   SAB IAB Ectopic Multiple Live Births                  # Outcome Date GA Lbr N[place]or/2nd Weight Sex Delivery Anes PTL Lv   3 Para            2 Para            1 Para                 Past Medical History  She has a past medical history of Bipolar II disorder (CMS/[facility]) (2017), Encounter for general adult medical examination without abnormal findings (10/19/2014), Major depressive disorder, recurrent, in partial remission (CMS/[facility]) (2021), Major depressive disorder, recurrent, mild (CMS/[facility]) (2017), Personal history of other mental and behavioral disorders (2017), Personal history of other mental and behavioral disorders (2017), and Personal history of other specified conditions.    Surgical History  She has a past surgical history that includes Other surgical history (2014); Total vaginal hysterectomy (2014); and Hand surgery (Left, 2022).     Social History  She reports that she has never smoked. She has never been exposed to tobacco smoke. She has never used smokeless tobacco. She reports that she does not drink alcohol and does not use drugs.    Family History  Family History   Problem Relation Name Age of Onset    Hypertension Mother      Alzheimer's disease Father      No Known  "Problems Other          Allergies  Bee pollen    Review of Systems   Constitutional:  Negative for appetite change, chills, fatigue, fever and unexpected weight change.   Respiratory:  Negative for shortness of breath.    Cardiovascular:  Negative for chest pain.   Gastrointestinal:  Negative for abdominal distention, abdominal pain, blood in stool, constipation, diarrhea, nausea and vomiting.   Endocrine: Negative for cold intolerance and heat intolerance.   Genitourinary:  Negative for dyspareunia, dysuria, flank pain, frequency, genital sores, hematuria, menstrual problem, pelvic pain, urgency, vaginal bleeding, vaginal discharge and vaginal pain.   Musculoskeletal:  Negative for back pain.   Skin:  Negative for color change.   Psychiatric/Behavioral:  Negative for sleep disturbance.        /76   Ht 1.702 m (5' 7\")   Wt 106 kg (233 lb)   BMI 36.49 kg/m²      Physical Exam  Constitutional:       Appearance: Normal appearance.   HENT:      Head: Normocephalic and atraumatic.   Chest:   Breasts:     Right: Normal.      Left: Normal.   Abdominal:      General: Abdomen is flat.      Palpations: Abdomen is soft.      Tenderness: There is no abdominal tenderness.   Genitourinary:     General: Normal vulva.      Vagina: Normal.      Uterus: Absent.       Adnexa: Right adnexa normal and left adnexa normal.      Comments: Cervix and uterus surgically absent  Vaginal cuff wnl     Skin:     General: Skin is warm and dry.   Neurological:      Mental Status: She is alert and oriented to person, place, and time.   Psychiatric:         Mood and Affect: Mood normal.          Last Recorded Vitals  Blood pressure 128/76, height 1.702 m (5' 7\"), weight 106 kg (233 lb).         Assessment/Plan         Routine Well Woman Exam Today  Discussed diet and exercise.   Reviewed routine health screenings.   Pap: no longer needed d/t hysterectomy for benign reasons   Recommend annual mammograms. Pt has order and will schedule.        "           Mis Hall MD

## 2023-12-07 LAB — BACTERIA UR CULT: NORMAL

## 2023-12-12 ENCOUNTER — APPOINTMENT (OUTPATIENT)
Dept: BEHAVIORAL HEALTH | Facility: CLINIC | Age: 58
End: 2023-12-12
Payer: COMMERCIAL

## 2023-12-14 ENCOUNTER — TELEMEDICINE (OUTPATIENT)
Dept: BEHAVIORAL HEALTH | Facility: CLINIC | Age: 58
End: 2023-12-14
Payer: COMMERCIAL

## 2023-12-14 DIAGNOSIS — F41.8 ANXIETY WITH SOMATIC FEATURES: ICD-10-CM

## 2023-12-14 DIAGNOSIS — F33.1 MODERATE EPISODE OF RECURRENT MAJOR DEPRESSIVE DISORDER (MULTI): ICD-10-CM

## 2023-12-14 DIAGNOSIS — F42.8 OTHER OBSESSIVE-COMPULSIVE DISORDERS: ICD-10-CM

## 2023-12-14 PROCEDURE — 3008F BODY MASS INDEX DOCD: CPT | Performed by: CLINICAL NURSE SPECIALIST

## 2023-12-14 PROCEDURE — 1036F TOBACCO NON-USER: CPT | Performed by: CLINICAL NURSE SPECIALIST

## 2023-12-14 PROCEDURE — 99213 OFFICE O/P EST LOW 20 MIN: CPT | Performed by: CLINICAL NURSE SPECIALIST

## 2023-12-14 NOTE — PROGRESS NOTES
Outpatient Psychiatry      Subjective   Rachel Gomez, is a 57 y.o. female,  seen in follow-up.      Assessment/Plan   Tolerating and benefiting from current regimen. No indication for medication changes today.      Advised of potentially fatal rash (i.e. Saucedo-Julio Cesar Syndrome) in 1:10,000 individuals. Agrees to stop medication and seek medical attention immediately if rash or blistering of the mucosal surfaces develops; also discussed risk for benign drug-induced rash, and advised to cease medication and seek immediate medical attention to r/o SJS given its potential lethality. Further advised that the risk for this reaction increases if proper dose titration is not followed, and that after 2 days of non-adherence, re-titration is necessary.     Continue lamotrigine 200 mg PO daily- no refill needed today.    Continue sertraline 200 mg PO daily. -no refill needed today.    Can assist with referral for individual therapy if she becomes interested.         Follow-up in 3 months.     Risk Assessment:  Risk Assessment: Rachel Gomez is currently a low acute risk of suicide and self-harm due to no past suicide attempt(s) and not currently endorsing thoughts of suicide. Rachel Gomez is currently a low acute risk of violence and harm to others due to no past history of violence and not currently threatening others.  Suicidal Risk Factors:  and current psychiatric illness  Violence Risk Factors: none  Protective Factors: strong coping skills, sense of responsibility towards family, social support/connectedness, moral objections to suicide, positive family relationships, hopefulness/future orientation, and marriage/partnership      Reason for Visit:  Follow-up for medication management.     HPI:  Since her last visit,     Feeling well  Hand is feeling much better- saw new provider and started cortisone injections. Has been significantly better.   Migraines have also been better    Mood has been  "\"maybe a little bit better,\" definitely not worse.  Anxiety has gotten a little better- surprising bc holiday time is typically stressful    More motivated to do things around the house- getting things organized. Taking ~15 min/day rather than getting overwhelmed with trying to do it all in one day.    Denies suicidal ideation or a passive death wish.     No new medications or health problems. Started black cohosh a few days ago for hot flashes.       Current Psychiatric Medications:  Lamotrigine 200 mg PO daily  Sertraline 200 mg PO daily      Record Review: brief     Medical Review Of Systems:  Pertinent items are noted in HPI.    Psychiatric Review Of Systems:  As noted in HPI.        Objective   Mental Status Exam  General Appearance: Well groomed, appropriate eye contact  Attitude/Behavior: Cooperative  Motor: No psychomotor agitation or retardation, no tremor or other abnormal movements  Speech: Normal rate, volume, prosody  Gait/Station: Other:(comment) (not observed- virtual visit)  Mood: \"Mood a little better.\"  Affect: Constricted, Congruent with mood and topic of conversation  Thought Process: Linear, goal directed  Thought Associations: No loosening of associations  Thought Content: Normal, Other: (comment) (anxious themes r/t family dynamics, especially son)  Perception: No perceptual abnormalities noted  Sensorium: Alert and oriented to person, place, time and situation  Insight: Intact  Judgement: Intact  Cognition: Cognitively intact to conversational testing with respect to attention, orientation, fund of knowledge, recent and remote memory, and language    Vitals:  There were no vitals filed for this visit.    Labs:  not applicable      Dominique Hoang, APRN-CNP, APRN-CNS  "

## 2024-01-08 ENCOUNTER — LAB (OUTPATIENT)
Dept: LAB | Facility: LAB | Age: 59
End: 2024-01-08
Payer: COMMERCIAL

## 2024-01-08 DIAGNOSIS — D35.2 BENIGN NEOPLASM OF PITUITARY GLAND (MULTI): Primary | ICD-10-CM

## 2024-01-08 DIAGNOSIS — E55.9 VITAMIN D DEFICIENCY, UNSPECIFIED: ICD-10-CM

## 2024-01-08 LAB
25(OH)D3 SERPL-MCNC: 48 NG/ML (ref 30–100)
T4 FREE SERPL-MCNC: 1.45 NG/DL (ref 0.61–1.12)

## 2024-01-08 PROCEDURE — 82306 VITAMIN D 25 HYDROXY: CPT

## 2024-01-08 PROCEDURE — 36415 COLL VENOUS BLD VENIPUNCTURE: CPT

## 2024-01-08 PROCEDURE — 84439 ASSAY OF FREE THYROXINE: CPT

## 2024-01-10 ENCOUNTER — OFFICE VISIT (OUTPATIENT)
Dept: ENDOCRINOLOGY | Facility: CLINIC | Age: 59
End: 2024-01-10
Payer: COMMERCIAL

## 2024-01-10 VITALS
WEIGHT: 235 LBS | DIASTOLIC BLOOD PRESSURE: 85 MMHG | BODY MASS INDEX: 36.88 KG/M2 | HEIGHT: 67 IN | SYSTOLIC BLOOD PRESSURE: 126 MMHG

## 2024-01-10 DIAGNOSIS — E55.9 VITAMIN D DEFICIENCY: ICD-10-CM

## 2024-01-10 DIAGNOSIS — E03.9 HYPOTHYROIDISM, UNSPECIFIED TYPE: Primary | ICD-10-CM

## 2024-01-10 PROCEDURE — 3008F BODY MASS INDEX DOCD: CPT | Performed by: STUDENT IN AN ORGANIZED HEALTH CARE EDUCATION/TRAINING PROGRAM

## 2024-01-10 PROCEDURE — 1036F TOBACCO NON-USER: CPT | Performed by: STUDENT IN AN ORGANIZED HEALTH CARE EDUCATION/TRAINING PROGRAM

## 2024-01-10 PROCEDURE — 99214 OFFICE O/P EST MOD 30 MIN: CPT | Performed by: STUDENT IN AN ORGANIZED HEALTH CARE EDUCATION/TRAINING PROGRAM

## 2024-01-10 PROCEDURE — 3079F DIAST BP 80-89 MM HG: CPT | Performed by: STUDENT IN AN ORGANIZED HEALTH CARE EDUCATION/TRAINING PROGRAM

## 2024-01-10 PROCEDURE — 3074F SYST BP LT 130 MM HG: CPT | Performed by: STUDENT IN AN ORGANIZED HEALTH CARE EDUCATION/TRAINING PROGRAM

## 2024-01-10 ASSESSMENT — ENCOUNTER SYMPTOMS: CONSTITUTIONAL NEGATIVE: 1

## 2024-01-10 NOTE — PROGRESS NOTES
"Subjective   Patient ID: Rachel Gomez is a 58 y.o. female who presents for Hypothyroidism (PCP:Gross /Fatigue symptoms on levothyroxine /Synthroid SAMANTHA through synthroid direct pharmacy /Recent labs done 1/8/24/) and Vitamin D Deficiency (Recent labs done 1/8/24/Current: 50,000 vitamin D weekly )     HPI  The patient is a 55 yo female with PMH of pituitary macroadenoma s/p TSS 10 years ago at [facility] ( pathology unknown),secondary hypothyroidism . She present today for regular follow up.  She feels well but energy and mood remain on lower side.  She thinks the weather may be the reason      Pituitary adenoma history :  She followed with Endocrinology at [facility] , she previously followed with Dr. Rausch and Dr. cannon  Diagnosed with hypothyroidism after her TSS  She was started on LT4 replacement after her TSS      She had a hysterectomy 10 year ago for uterine prolapse , Ovaries were not removed.     Review of Systems   Constitutional: Negative.        Objective   Physical Exam  Constitutional:       Appearance: Normal appearance.   Cardiovascular:      Rate and Rhythm: Normal rate and regular rhythm.   Pulmonary:      Effort: Pulmonary effort is normal.      Breath sounds: Normal breath sounds.   Neurological:      Mental Status: She is alert.   Psychiatric:         Mood and Affect: Mood normal.      Visit Vitals  /85   Ht 1.702 m (5' 7\")   Wt 107 kg (235 lb)   BMI 36.81 kg/m²   OB Status Hysterectomy   Smoking Status Never   BSA 2.25 m²            Assessment/Plan       The patient is a 55 yo female with PMH of pituitary macroadenoma s/p TSS 10 years ago at [facility] ( pathology unknown) with secondary hypothyroidism .      -Pt with secondary hypothyroidism on replacement , biochemically and clinically euthyroid on Synthroid 137 mcg through  delivery. Discussed trying to reduce dose to 6.5 pills per week to reevaluate for improvement in hot flashes     -No symptoms of AI or DI , no other hormonal " deficiencies detected   -Migraine headaches , follows with neurology , on Botox and is on Emglaity.  -chronic GERD on PPI for ~ 10 years   - vitamin D deficiency finished vitamin D 50,000 IU weekly , now takes 10,000 international  once a week      RTC in 6 months with labs

## 2024-01-12 ENCOUNTER — TELEPHONE (OUTPATIENT)
Dept: NEUROLOGY | Facility: CLINIC | Age: 59
End: 2024-01-12
Payer: COMMERCIAL

## 2024-01-12 NOTE — TELEPHONE ENCOUNTER
Patient requesting medrol pack, states she has been having migraine longer than a week and nothing is helping it.    Please advise

## 2024-01-15 DIAGNOSIS — G43.719 INTRACTABLE CHRONIC MIGRAINE WITHOUT AURA AND WITHOUT STATUS MIGRAINOSUS: Primary | ICD-10-CM

## 2024-01-15 RX ORDER — METHYLPREDNISOLONE 4 MG/1
TABLET ORAL
Qty: 21 TABLET | Refills: 0 | Status: SHIPPED | OUTPATIENT
Start: 2024-01-15 | End: 2024-02-15 | Stop reason: ALTCHOICE

## 2024-01-23 ENCOUNTER — TELEPHONE (OUTPATIENT)
Dept: ENDOCRINOLOGY | Facility: CLINIC | Age: 59
End: 2024-01-23
Payer: COMMERCIAL

## 2024-01-23 ENCOUNTER — SPECIALTY PHARMACY (OUTPATIENT)
Dept: PHARMACY | Facility: CLINIC | Age: 59
End: 2024-01-23

## 2024-01-23 DIAGNOSIS — E03.9 HYPOTHYROIDISM, UNSPECIFIED TYPE: ICD-10-CM

## 2024-01-23 RX ORDER — LEVOTHYROXINE SODIUM 137 UG/1
137 TABLET ORAL DAILY
Qty: 90 TABLET | Refills: 1 | Status: SHIPPED | OUTPATIENT
Start: 2024-01-23

## 2024-01-23 NOTE — TELEPHONE ENCOUNTER
Rachel called 1-23-24 asking for a refill of her Synthroid 137 mcg (SAMANTHA)  To to her [facility] mail order pharmacy #[phone] every day.

## 2024-01-26 ENCOUNTER — SPECIALTY PHARMACY (OUTPATIENT)
Dept: PHARMACY | Facility: CLINIC | Age: 59
End: 2024-01-26

## 2024-02-05 ENCOUNTER — OFFICE VISIT (OUTPATIENT)
Dept: ORTHOPEDIC SURGERY | Facility: CLINIC | Age: 59
End: 2024-02-05
Payer: COMMERCIAL

## 2024-02-05 VITALS — BODY MASS INDEX: 36.88 KG/M2 | WEIGHT: 235 LBS | HEIGHT: 67 IN

## 2024-02-05 DIAGNOSIS — M19.049 CMC ARTHRITIS: Primary | ICD-10-CM

## 2024-02-05 PROCEDURE — 20600 DRAIN/INJ JOINT/BURSA W/O US: CPT | Performed by: ORTHOPAEDIC SURGERY

## 2024-02-05 PROCEDURE — 1036F TOBACCO NON-USER: CPT | Performed by: ORTHOPAEDIC SURGERY

## 2024-02-05 PROCEDURE — 3008F BODY MASS INDEX DOCD: CPT | Performed by: ORTHOPAEDIC SURGERY

## 2024-02-05 PROCEDURE — 99213 OFFICE O/P EST LOW 20 MIN: CPT | Performed by: ORTHOPAEDIC SURGERY

## 2024-02-05 RX ORDER — LIDOCAINE HYDROCHLORIDE 10 MG/ML
0.5 INJECTION INFILTRATION; PERINEURAL
Status: COMPLETED | OUTPATIENT
Start: 2024-02-05 | End: 2024-02-05

## 2024-02-05 RX ORDER — TRIAMCINOLONE ACETONIDE 40 MG/ML
20 INJECTION, SUSPENSION INTRA-ARTICULAR; INTRAMUSCULAR
Status: COMPLETED | OUTPATIENT
Start: 2024-02-05 | End: 2024-02-05

## 2024-02-05 RX ADMIN — TRIAMCINOLONE ACETONIDE 20 MG: 40 INJECTION, SUSPENSION INTRA-ARTICULAR; INTRAMUSCULAR at 18:53

## 2024-02-05 RX ADMIN — LIDOCAINE HYDROCHLORIDE 0.5 ML: 10 INJECTION INFILTRATION; PERINEURAL at 18:53

## 2024-02-05 ASSESSMENT — PAIN - FUNCTIONAL ASSESSMENT: PAIN_FUNCTIONAL_ASSESSMENT: NO/DENIES PAIN

## 2024-02-05 NOTE — PROGRESS NOTES
Rachel returns to clinic today for her bilateral thumb pain. She had steroid injections with us a few months ago. She had about 2 months of relief. Over last month she has had increasing pain, worse in her right than her left. She has had a previous arthrotomy and debridement of left thumb by another surgeon a few years ago.     Past medical history, medications, allergies, surgical history and review of systems are reviewed and otherwise unchanged when compared to last visit on 11/06/23.         Examination:     Constitutional: Oriented to person, place, and time.     Appears well-developed and well-nourished.     Head: Normocephalic and atraumatic.     Eyes: Pupils are equal, round, and reactive to light.     Cardiovascular: Intact distal pulses.     Pulmonary/Chest/Breast: Effort normal. No respiratory distress.     Neurological: Alert and oriented to person, place, and time.     Skin: Skin is warm and dry.     Psychiatric: normal mood and affect. Behavior is normal.     Musculoskeletal: Examination of the bilateral hands reveals mild swelling at base of the thumbs. Well healed surgical incision over the left thumb CMC joint. Positive CMC grind test. No thenar atrophy. Normal subjective sensation.          No new imaging was obtained today.          Impression: Bilateral thumb CMC joint arthritis         Plan: We did bilateral thumb CMC joint injections for her today. She will follow up in 3 months for repeat clinical examination and possible repeat injections. If she continues to need injections on this interval we will likely need to discuss more formal surgical reconstruction options for her.    Risks and benefits of steroid injection discussed with patient. Risks include but are not limited to: pain, infection, allergic reaction to injected medications, changes in the color or appearance of the skin near the location site and along lymphatic drainage pathway, lack of response, cartilage damage, ligament /  tendon rupture, and glycemic control issues in diabetic patients. Patient expresses understanding of risks and elects to proceed. Verbal consent was provided and a time out procedure was performed to confirm the appropriate injection location. Using aseptic technique 20 mg triamcinolone and 0.5 cc 1% lidocaine were injected into the bilateral thumb CMC joints. The patient tolerated the injection well. Post injection instructions were provided.       S Inj/Asp: bilateral thumb CMC on 2/5/2024 6:53 PM  Indications: pain  Details: 25 G needle, dorsal approach  Medications (Right): 20 mg triamcinolone acetonide 40 mg/mL; 0.5 mL lidocaine 10 mg/mL (1 %)  Medications (Left): 20 mg triamcinolone acetonide 40 mg/mL; 0.5 mL lidocaine 10 mg/mL (1 %)  Outcome: tolerated well, no immediate complications  Procedure, treatment alternatives, risks and benefits explained, specific risks discussed. Consent was given by the patient. Immediately prior to procedure a time out was called to verify the correct patient, procedure, equipment, support staff and site/side marked as required. Patient was prepped and draped in the usual sterile fashion.                Sammy Felix MD          [employer]     Department of Orthopaedic Surgery     Chief of Hand and Upper Extremity Surgery     [facility]

## 2024-02-07 ENCOUNTER — APPOINTMENT (OUTPATIENT)
Dept: NEUROLOGY | Facility: CLINIC | Age: 59
End: 2024-02-07
Payer: COMMERCIAL

## 2024-02-08 ENCOUNTER — SPECIALTY PHARMACY (OUTPATIENT)
Dept: PHARMACY | Facility: CLINIC | Age: 59
End: 2024-02-08

## 2024-02-11 DIAGNOSIS — G25.2 OTHER SPECIFIED FORMS OF TREMOR: ICD-10-CM

## 2024-02-12 ENCOUNTER — TELEPHONE (OUTPATIENT)
Dept: PRIMARY CARE | Facility: CLINIC | Age: 59
End: 2024-02-12
Payer: COMMERCIAL

## 2024-02-12 ENCOUNTER — LAB (OUTPATIENT)
Dept: LAB | Facility: LAB | Age: 59
End: 2024-02-12
Payer: COMMERCIAL

## 2024-02-12 DIAGNOSIS — M15.9 OSTEOARTHRITIS OF MULTIPLE JOINTS, UNSPECIFIED OSTEOARTHRITIS TYPE: ICD-10-CM

## 2024-02-12 DIAGNOSIS — Z00.00 HEALTHCARE MAINTENANCE: Primary | ICD-10-CM

## 2024-02-12 DIAGNOSIS — Z00.00 HEALTHCARE MAINTENANCE: ICD-10-CM

## 2024-02-12 LAB
ALBUMIN SERPL BCP-MCNC: 4.5 G/DL (ref 3.4–5)
ALP SERPL-CCNC: 121 U/L (ref 33–110)
ALT SERPL W P-5'-P-CCNC: 10 U/L (ref 7–45)
ANION GAP SERPL CALC-SCNC: 12 MMOL/L (ref 10–20)
AST SERPL W P-5'-P-CCNC: 13 U/L (ref 9–39)
BILIRUB SERPL-MCNC: 0.5 MG/DL (ref 0–1.2)
BUN SERPL-MCNC: 22 MG/DL (ref 6–23)
CALCIUM SERPL-MCNC: 9.9 MG/DL (ref 8.6–10.3)
CHLORIDE SERPL-SCNC: 104 MMOL/L (ref 98–107)
CHOLEST SERPL-MCNC: 184 MG/DL (ref 0–199)
CHOLESTEROL/HDL RATIO: 2.7
CO2 SERPL-SCNC: 27 MMOL/L (ref 21–32)
CREAT SERPL-MCNC: 0.81 MG/DL (ref 0.5–1.05)
EGFRCR SERPLBLD CKD-EPI 2021: 85 ML/MIN/1.73M*2
ERYTHROCYTE [DISTWIDTH] IN BLOOD BY AUTOMATED COUNT: 13.4 % (ref 11.5–14.5)
GLUCOSE SERPL-MCNC: 100 MG/DL (ref 74–99)
HCT VFR BLD AUTO: 40.4 % (ref 36–46)
HDLC SERPL-MCNC: 67.5 MG/DL
HGB BLD-MCNC: 12.3 G/DL (ref 12–16)
LDLC SERPL CALC-MCNC: 88 MG/DL
MCH RBC QN AUTO: 27.4 PG (ref 26–34)
MCHC RBC AUTO-ENTMCNC: 30.4 G/DL (ref 32–36)
MCV RBC AUTO: 90 FL (ref 80–100)
NON HDL CHOLESTEROL: 117 MG/DL (ref 0–149)
NRBC BLD-RTO: 0 /100 WBCS (ref 0–0)
PLATELET # BLD AUTO: 291 X10*3/UL (ref 150–450)
POTASSIUM SERPL-SCNC: 4.6 MMOL/L (ref 3.5–5.3)
PROT SERPL-MCNC: 7.1 G/DL (ref 6.4–8.2)
RBC # BLD AUTO: 4.49 X10*6/UL (ref 4–5.2)
SODIUM SERPL-SCNC: 138 MMOL/L (ref 136–145)
T4 FREE SERPL-MCNC: 1.27 NG/DL (ref 0.61–1.12)
TRIGL SERPL-MCNC: 145 MG/DL (ref 0–149)
TSH SERPL-ACNC: 0.04 MIU/L (ref 0.44–3.98)
VLDL: 29 MG/DL (ref 0–40)
WBC # BLD AUTO: 7.5 X10*3/UL (ref 4.4–11.3)

## 2024-02-12 PROCEDURE — 80053 COMPREHEN METABOLIC PANEL: CPT

## 2024-02-12 PROCEDURE — 36415 COLL VENOUS BLD VENIPUNCTURE: CPT

## 2024-02-12 PROCEDURE — 84439 ASSAY OF FREE THYROXINE: CPT

## 2024-02-12 PROCEDURE — 80061 LIPID PANEL: CPT

## 2024-02-12 PROCEDURE — 85027 COMPLETE CBC AUTOMATED: CPT

## 2024-02-12 PROCEDURE — 84443 ASSAY THYROID STIM HORMONE: CPT

## 2024-02-12 RX ORDER — PROPRANOLOL HYDROCHLORIDE 60 MG/1
60 CAPSULE, EXTENDED RELEASE ORAL DAILY
Qty: 90 CAPSULE | Refills: 3 | Status: SHIPPED | OUTPATIENT
Start: 2024-02-12

## 2024-02-14 PROBLEM — R06.09 DYSPNEA ON EXERTION: Status: RESOLVED | Noted: 2023-11-05 | Resolved: 2024-02-14

## 2024-02-15 ENCOUNTER — OFFICE VISIT (OUTPATIENT)
Dept: PRIMARY CARE | Facility: CLINIC | Age: 59
End: 2024-02-15
Payer: COMMERCIAL

## 2024-02-15 VITALS
HEIGHT: 67 IN | OXYGEN SATURATION: 99 % | DIASTOLIC BLOOD PRESSURE: 70 MMHG | TEMPERATURE: 98 F | SYSTOLIC BLOOD PRESSURE: 130 MMHG | RESPIRATION RATE: 16 BRPM | BODY MASS INDEX: 36.41 KG/M2 | WEIGHT: 232 LBS | HEART RATE: 54 BPM

## 2024-02-15 DIAGNOSIS — E55.9 VITAMIN D DEFICIENCY: ICD-10-CM

## 2024-02-15 DIAGNOSIS — F33.1 MODERATE EPISODE OF RECURRENT MAJOR DEPRESSIVE DISORDER (MULTI): ICD-10-CM

## 2024-02-15 DIAGNOSIS — Z00.00 HEALTHCARE MAINTENANCE: Primary | ICD-10-CM

## 2024-02-15 DIAGNOSIS — E78.2 MIXED HYPERLIPIDEMIA: ICD-10-CM

## 2024-02-15 DIAGNOSIS — D35.2 PITUITARY ADENOMA (MULTI): ICD-10-CM

## 2024-02-15 DIAGNOSIS — M15.9 OSTEOARTHRITIS OF MULTIPLE JOINTS, UNSPECIFIED OSTEOARTHRITIS TYPE: ICD-10-CM

## 2024-02-15 DIAGNOSIS — I10 ESSENTIAL (PRIMARY) HYPERTENSION: ICD-10-CM

## 2024-02-15 DIAGNOSIS — Z12.31 BREAST CANCER SCREENING BY MAMMOGRAM: ICD-10-CM

## 2024-02-15 DIAGNOSIS — E66.01 CLASS 2 SEVERE OBESITY DUE TO EXCESS CALORIES WITH SERIOUS COMORBIDITY AND BODY MASS INDEX (BMI) OF 36.0 TO 36.9 IN ADULT (MULTI): ICD-10-CM

## 2024-02-15 DIAGNOSIS — E03.9 HYPOTHYROIDISM, UNSPECIFIED TYPE: ICD-10-CM

## 2024-02-15 DIAGNOSIS — G43.719 INTRACTABLE CHRONIC MIGRAINE WITHOUT AURA AND WITHOUT STATUS MIGRAINOSUS: ICD-10-CM

## 2024-02-15 DIAGNOSIS — E23.6 RATHKE'S CLEFT CYST (MULTI): ICD-10-CM

## 2024-02-15 PROCEDURE — 93000 ELECTROCARDIOGRAM COMPLETE: CPT | Performed by: INTERNAL MEDICINE

## 2024-02-15 PROCEDURE — 3008F BODY MASS INDEX DOCD: CPT | Performed by: INTERNAL MEDICINE

## 2024-02-15 PROCEDURE — 99396 PREV VISIT EST AGE 40-64: CPT | Performed by: INTERNAL MEDICINE

## 2024-02-15 PROCEDURE — 3078F DIAST BP <80 MM HG: CPT | Performed by: INTERNAL MEDICINE

## 2024-02-15 PROCEDURE — 3075F SYST BP GE 130 - 139MM HG: CPT | Performed by: INTERNAL MEDICINE

## 2024-02-15 PROCEDURE — 1036F TOBACCO NON-USER: CPT | Performed by: INTERNAL MEDICINE

## 2024-02-15 ASSESSMENT — ENCOUNTER SYMPTOMS
CONSTITUTIONAL NEGATIVE: 1
CARDIOVASCULAR NEGATIVE: 1
COLOR CHANGE: 0
SLEEP DISTURBANCE: 0
CONSTIPATION: 0
CONFUSION: 0
BLOOD IN STOOL: 0
OCCASIONAL FEELINGS OF UNSTEADINESS: 0
EYE DISCHARGE: 0
TREMORS: 0
BRUISES/BLEEDS EASILY: 0
VOICE CHANGE: 0
ALLERGIC/IMMUNOLOGIC NEGATIVE: 1
STRIDOR: 0
NAUSEA: 0
WHEEZING: 0
FACIAL ASYMMETRY: 0
WOUND: 0
DIZZINESS: 0
NERVOUS/ANXIOUS: 0
ACTIVITY CHANGE: 0
SINUS PRESSURE: 0
JOINT SWELLING: 0
ARTHRALGIAS: 0
DIFFICULTY URINATING: 0
FLANK PAIN: 0
SEIZURES: 0
MYALGIAS: 0
ENDOCRINE NEGATIVE: 1
LOSS OF SENSATION IN FEET: 0
SORE THROAT: 0
APPETITE CHANGE: 0
ABDOMINAL PAIN: 0
LIGHT-HEADEDNESS: 0
HALLUCINATIONS: 0
SINUS PAIN: 0
EYE REDNESS: 0
POLYPHAGIA: 0
POLYDIPSIA: 0
DIARRHEA: 0
HEADACHES: 0
AGITATION: 0
SPEECH DIFFICULTY: 0
UNEXPECTED WEIGHT CHANGE: 0
SHORTNESS OF BREATH: 0
BACK PAIN: 0
NECK STIFFNESS: 0
FREQUENCY: 0
HEMATOLOGIC/LYMPHATIC NEGATIVE: 1
ADENOPATHY: 0
PSYCHIATRIC NEGATIVE: 1
PALPITATIONS: 0
CHEST TIGHTNESS: 0
GASTROINTESTINAL NEGATIVE: 1
WEAKNESS: 0
RESPIRATORY NEGATIVE: 1
COUGH: 0
MUSCULOSKELETAL NEGATIVE: 1
EYES NEGATIVE: 1
EYE PAIN: 0
VOMITING: 0
DEPRESSION: 0
DYSURIA: 0
NUMBNESS: 0
TROUBLE SWALLOWING: 0
NECK PAIN: 0
NEUROLOGICAL NEGATIVE: 1

## 2024-02-15 ASSESSMENT — PATIENT HEALTH QUESTIONNAIRE - PHQ9
1. LITTLE INTEREST OR PLEASURE IN DOING THINGS: NOT AT ALL
2. FEELING DOWN, DEPRESSED OR HOPELESS: NOT AT ALL
SUM OF ALL RESPONSES TO PHQ9 QUESTIONS 1 AND 2: 0

## 2024-02-15 NOTE — PROGRESS NOTES
Subjective   Patient ID: Rachel Gomez is a 58 y.o. female who presents for Annual Exam (Patient is here for a physical. ).  HPI    Patient has been feeling pretty good and has been complaint with prescribed medications.    We reviewed and discussed details of recent blood work: CBC, CMP, TSH, Lipid panel, Hb A1c, VitD done in Feb 2024 .  Results within acceptable range.  Mildly abnormal TSH and T4 noted, patient will f/up with her endocrinologist.     Last mammogram: 5/12/2023  DEXA: n/a  PAP: per GYN  Colonoscopy: Cologuard: 2/23: neg       She has h/o Hypothyroidism, HLD, Obesity, Anxiety, Depression, Bipolar disorder, OCD, Migraines, GERD, pituitary adenoma. Follows with endocrinologist and psychiatrist.     She has been c/o multiple joints pain, was evaluated by rheumatologist, no connective tissue disease identified.  Diagnosed with fibromyalgia, advised to see [facility] providers.      She has difficulties losing weight.     C/o GERD symptoms, has been taking PPI daily, tried to wean off but symptoms have been coming back.        Review of Systems   Constitutional: Negative.  Negative for activity change, appetite change and unexpected weight change.   HENT: Negative.  Negative for congestion, ear discharge, ear pain, hearing loss, mouth sores, nosebleeds, sinus pressure, sinus pain, sore throat, trouble swallowing and voice change.    Eyes: Negative.  Negative for pain, discharge, redness and visual disturbance.   Respiratory: Negative.  Negative for cough, chest tightness, shortness of breath, wheezing and stridor.    Cardiovascular: Negative.  Negative for chest pain, palpitations and leg swelling.   Gastrointestinal: Negative.  Negative for abdominal pain, blood in stool, constipation, diarrhea, nausea and vomiting.   Endocrine: Negative.  Negative for polydipsia, polyphagia and polyuria.   Genitourinary: Negative.  Negative for difficulty urinating, dysuria, flank pain, frequency and urgency.  "  Musculoskeletal: Negative.  Negative for arthralgias, back pain, gait problem, joint swelling, myalgias, neck pain and neck stiffness.   Skin: Negative.  Negative for color change, rash and wound.   Allergic/Immunologic: Negative.  Negative for environmental allergies, food allergies and immunocompromised state.   Neurological: Negative.  Negative for dizziness, tremors, seizures, syncope, facial asymmetry, speech difficulty, weakness, light-headedness, numbness and headaches.   Hematological: Negative.  Negative for adenopathy. Does not bruise/bleed easily.   Psychiatric/Behavioral: Negative.  Negative for agitation, behavioral problems, confusion, hallucinations, sleep disturbance and suicidal ideas. The patient is not nervous/anxious.    All other systems reviewed and are negative.      Objective     Review of systems was performed and all systems were negative except what in HPI    /70   Pulse 54   Temp 36.7 °C (98 °F)   Resp 16   Ht 1.702 m (5' 7\")   Wt 105 kg (232 lb)   SpO2 99%   BMI 36.34 kg/m²    Physical Exam  Vitals and nursing note reviewed.   Constitutional:       General: She is not in acute distress.     Appearance: Normal appearance.   HENT:      Head: Normocephalic and atraumatic.      Right Ear: Tympanic membrane, ear canal and external ear normal.      Left Ear: Tympanic membrane, ear canal and external ear normal.      Nose: Nose normal. No congestion or rhinorrhea.      Mouth/Throat:      Mouth: Mucous membranes are moist.      Pharynx: Oropharynx is clear.   Eyes:      General:         Right eye: No discharge.         Left eye: No discharge.      Extraocular Movements: Extraocular movements intact.      Conjunctiva/sclera: Conjunctivae normal.      Pupils: Pupils are equal, round, and reactive to light.   Cardiovascular:      Rate and Rhythm: Normal rate and regular rhythm.      Pulses: Normal pulses.      Heart sounds: Normal heart sounds. No murmur heard.     No friction rub. " No gallop.   Pulmonary:      Effort: Pulmonary effort is normal. No respiratory distress.      Breath sounds: Normal breath sounds. No stridor. No wheezing, rhonchi or rales.   Chest:      Chest wall: No tenderness.   Abdominal:      General: Bowel sounds are normal.      Palpations: Abdomen is soft. There is no mass.      Tenderness: There is no abdominal tenderness. There is no guarding or rebound.   Musculoskeletal:         General: No swelling or deformity. Normal range of motion.      Cervical back: Normal range of motion and neck supple. No rigidity or tenderness.      Right lower leg: No edema.      Left lower leg: No edema.   Lymphadenopathy:      Cervical: No cervical adenopathy.   Skin:     General: Skin is warm and dry.      Coloration: Skin is not jaundiced.      Findings: No bruising or erythema.   Neurological:      General: No focal deficit present.      Mental Status: She is alert and oriented to person, place, and time. Mental status is at baseline.      Cranial Nerves: No cranial nerve deficit.      Motor: No weakness.      Coordination: Coordination normal.      Gait: Gait normal.   Psychiatric:         Mood and Affect: Mood normal.         Behavior: Behavior normal.         Thought Content: Thought content normal.         Judgment: Judgment normal.         Assessment/Plan   Problem List Items Addressed This Visit             ICD-10-CM       Cardiac and Vasculature    Hyperlipidemia E78.5     Clinically stable. Continue statin.         Relevant Orders    ECG 12 lead (Clinic Performed) (Completed)    Essential (primary) hypertension I10     Controlled. Continue current treatment.             Endocrine/Metabolic    Hypothyroidism E03.9     Clinically stable. F/up with endocrinology.          Obesity E66.9     Weight loss is advised. Target BMI: 25. Please follow low carbohydrate diet and exercise at least 150 minutes weekly.  Nutritional consultation is available, please let me know if interested.           Pituitary adenoma (CMS/HCC) D35.2     Clinically stable. F/up with endocrinology.         Vitamin D deficiency E55.9    Rathke's cleft cyst (CMS/HCC) E23.6     Clinically stable. Will monitor.             Health Encounters    Healthcare maintenance - Primary Z00.00    Relevant Orders    ECG 12 lead (Clinic Performed) (Completed)       Mental Health    Moderate episode of recurrent major depressive disorder (CMS/HCC) F33.1     Clinically sytabl. F/up with psychiatrist.             Musculoskeletal and Injuries    Osteoarthritis M19.90     Clinically stable. F/up with rheumatology and ortho.             Neuro    Intractable chronic migraine without aura and without status migrainosus G43.719     Clinically stable. F/up with neurology.          Other Visit Diagnoses         Codes    Breast cancer screening by mammogram     Z12.31    Relevant Orders    BI mammo bilateral screening tomosynthesis        It was a pleasure to see you today.  I would like to remind you about importance of a healthy lifestyle in order to improve your well-being and live longer.  Try to engage in physical activities for at least 150 minutes per week.  Eat about 10 servings of fruits and vegetables daily. My advice is 2 servings of fruits and 8 servings of vegetables.  For vegetables choose at least half of them green and at least half of them fresh.  Please avoid sugar, salt, fried food and saturated fat.    F/up in 1 year or sooner if needed.

## 2024-02-20 PROCEDURE — RXMED WILLOW AMBULATORY MEDICATION CHARGE

## 2024-02-21 ENCOUNTER — TELEMEDICINE (OUTPATIENT)
Dept: BEHAVIORAL HEALTH | Facility: CLINIC | Age: 59
End: 2024-02-21
Payer: COMMERCIAL

## 2024-02-21 ENCOUNTER — PHARMACY VISIT (OUTPATIENT)
Dept: PHARMACY | Facility: CLINIC | Age: 59
End: 2024-02-21
Payer: COMMERCIAL

## 2024-02-21 DIAGNOSIS — F42.8 OTHER OBSESSIVE-COMPULSIVE DISORDERS: ICD-10-CM

## 2024-02-21 DIAGNOSIS — G47.09 OTHER INSOMNIA: ICD-10-CM

## 2024-02-21 DIAGNOSIS — F33.1 MODERATE EPISODE OF RECURRENT MAJOR DEPRESSIVE DISORDER (MULTI): ICD-10-CM

## 2024-02-21 DIAGNOSIS — F41.8 ANXIETY WITH SOMATIC FEATURES: ICD-10-CM

## 2024-02-21 PROCEDURE — 3008F BODY MASS INDEX DOCD: CPT | Performed by: CLINICAL NURSE SPECIALIST

## 2024-02-21 PROCEDURE — 1036F TOBACCO NON-USER: CPT | Performed by: CLINICAL NURSE SPECIALIST

## 2024-02-21 PROCEDURE — 99214 OFFICE O/P EST MOD 30 MIN: CPT | Performed by: CLINICAL NURSE SPECIALIST

## 2024-02-21 RX ORDER — DOXEPIN HYDROCHLORIDE 10 MG/1
10 CAPSULE ORAL NIGHTLY PRN
Qty: 10 CAPSULE | Refills: 0 | Status: SHIPPED | OUTPATIENT
Start: 2024-02-21

## 2024-02-21 RX ORDER — LAMOTRIGINE 200 MG/1
200 TABLET ORAL DAILY
Qty: 90 TABLET | Refills: 3 | Status: SHIPPED | OUTPATIENT
Start: 2024-02-21 | End: 2025-02-15

## 2024-02-21 RX ORDER — SERTRALINE HYDROCHLORIDE 100 MG/1
200 TABLET, FILM COATED ORAL DAILY
Qty: 180 TABLET | Refills: 3 | Status: SHIPPED | OUTPATIENT
Start: 2024-02-21 | End: 2025-02-15

## 2024-02-21 NOTE — PROGRESS NOTES
Outpatient Psychiatry      Subjective   Rachel Gomez, is a 58 y.o. female,  seen in follow-up.      Assessment/Plan   Problem List Items Addressed This Visit             ICD-10-CM    Anxiety with somatic features F41.8     Tolerating and benefiting from current regimen. Has experienced sporadic, but significantly disruptive insomnia a/w anxiety recently. Discussed trialing a PRN medication for sleep and she is receptive.   Start doxepin 10 mg PO at bedtime PRN (10 caps). Discussed indication(s), reviewed risks/benefits/alternatives.    Continue sertraline 200 mg PO daily- refill sent to pharmacy today.   Aware that I can assist with referrals for individual or family therapy.          Relevant Medications    sertraline (Zoloft) 100 mg tablet    Other Relevant Orders    Follow Up In Psychiatry    Moderate episode of recurrent major depressive disorder (CMS/HCC) F33.1     Tolerating and benefiting from current regimen. No indication for medication changes today.    Continue lamotrigine 200 mg PO daily- refill sent to pharmacy today.   Continue sertraline 200 mg PO daily- refill sent to pharmacy today.     Advised of potentially fatal rash (i.e. Saucdeo-Ujlio Cesar Syndrome) in 1:10,000 individuals. Agrees to stop medication and seek medical attention immediately if rash or blistering of the mucosal surfaces develops; also discussed risk for benign drug-induced rash, and advised to cease medication and seek immediate medical attention to r/o SJS given its potential lethality. Further advised that the risk for this reaction increases if proper dose titration is not followed, and that after 2 days of non-adherence, re-titration is necessary.          Relevant Medications    lamoTRIgine (LaMICtal) 200 mg tablet    sertraline (Zoloft) 100 mg tablet    Other Relevant Orders    Follow Up In Psychiatry    Obsessive-compulsive disorder F42.9     Plan as noted above.          Relevant Medications    sertraline (Zoloft) 100 mg  "tablet    Other Relevant Orders    Follow Up In Psychiatry    Other insomnia G47.09     Trial of doxepin PRN as noted above.          Relevant Medications    doxepin (SINEquan) 10 mg capsule    Other Relevant Orders    Follow Up In Psychiatry       Follow-up in 3 months.     Risk Assessment:  Risk Assessment: Rachel Gomez is currently a low acute risk of suicide and self-harm due to no past suicide attempt(s) and not currently endorsing thoughts of suicide. Rachel Gomez is currently a low acute risk of violence and harm to others due to no past history of violence and not currently threatening others.  Suicidal Risk Factors:  and current psychiatric illness  Violence Risk Factors: none  Protective Factors: strong coping skills, sense of responsibility towards family, social support/connectedness, moral objections to suicide, positive family relationships, hopefulness/future orientation, and marriage/partnership      Reason for Visit:  Follow-up for medication management.     HPI:  Since her last visit,     Things are, \"probably about the same.\"  Holidays went well- low key, which is what she'd hoped for.   Anxiety is higher.  A lot of stress- son is still not working. Not able to pay bills. Getting notices about taxes that he owes, has not been able to pay her and  back for car insurance the past 2 months.   Causing a lot of frustration for her and her .   Two nights ago, couldn't even sleep because she was crying off and on.   At times questions whether she failed as a parent to him. Seems like those thoughts come up when she's lying in bed at night.     Has good support from her daughter and from some close friends.   When weather was better, helped to be able to get outside and have some space, but during the winter months that has been difficult.     Feeling helpless about the situation with her son. Sure that something needs to change, but not sure how to achieve that. Doesn't " "want to kick him out of the house- wouldn't ever do that, nor would her . Not sure how to get him motivated to make changes.     Denies suicidal ideation or a passive death wish.     No new medications or health problems. Saw PCP for physical last week.       Current Psychiatric Medications:  Lamotrigine 200 mg PO daily  Sertraline 200 mg PO daily    Record Review: brief     Medical Review Of Systems:  Pertinent items are noted in HPI.    Psychiatric Review Of Systems:  As noted in HPI.        Objective   Mental Status Exam  General Appearance: Well groomed, appropriate eye contact  Attitude/Behavior: Cooperative  Motor: No psychomotor agitation or retardation, no tremor or other abnormal movements  Speech: Normal rate, volume, prosody  Gait/Station: Other:(comment) (not observed- virtual)  Mood: \"Things have been really stressful.\"  Affect: Constricted, Anxious, Congruent with mood and topic of conversation, Irritable  Thought Process: Linear, goal directed  Thought Associations: No loosening of associations  Thought Content: Other: (comment) (anxious themes.)  Perception: No perceptual abnormalities noted  Sensorium: Alert and oriented to person, place, time and situation  Insight: Intact  Judgement: Intact  Cognition: Cognitively intact to conversational testing with respect to attention, orientation, fund of knowledge, recent and remote memory, and language    Vitals:  There were no vitals filed for this visit.    Labs:  not applicable      Dominique Hoang, APRN-CNP, APRN-CNS  "

## 2024-02-21 NOTE — ASSESSMENT & PLAN NOTE
Tolerating and benefiting from current regimen. Has experienced sporadic, but significantly disruptive insomnia a/w anxiety recently. Discussed trialing a PRN medication for sleep and she is receptive.   Start doxepin 10 mg PO at bedtime PRN (10 caps). Discussed indication(s), reviewed risks/benefits/alternatives.    Continue sertraline 200 mg PO daily- refill sent to pharmacy today.   Aware that I can assist with referrals for individual or family therapy.

## 2024-02-21 NOTE — ASSESSMENT & PLAN NOTE
Tolerating and benefiting from current regimen. No indication for medication changes today.    Continue lamotrigine 200 mg PO daily- refill sent to pharmacy today.   Continue sertraline 200 mg PO daily- refill sent to pharmacy today.     Advised of potentially fatal rash (i.e. Saucedo-Julio Cesar Syndrome) in 1:10,000 individuals. Agrees to stop medication and seek medical attention immediately if rash or blistering of the mucosal surfaces develops; also discussed risk for benign drug-induced rash, and advised to cease medication and seek immediate medical attention to r/o SJS given its potential lethality. Further advised that the risk for this reaction increases if proper dose titration is not followed, and that after 2 days of non-adherence, re-titration is necessary.

## 2024-02-22 ENCOUNTER — SPECIALTY PHARMACY (OUTPATIENT)
Dept: PHARMACY | Facility: CLINIC | Age: 59
End: 2024-02-22

## 2024-02-27 ENCOUNTER — SPECIALTY PHARMACY (OUTPATIENT)
Dept: PHARMACY | Facility: CLINIC | Age: 59
End: 2024-02-27

## 2024-04-12 DIAGNOSIS — E78.5 HYPERLIPIDEMIA, UNSPECIFIED: ICD-10-CM

## 2024-04-15 RX ORDER — ATORVASTATIN CALCIUM 40 MG/1
40 TABLET, FILM COATED ORAL NIGHTLY
Qty: 90 TABLET | Refills: 3 | Status: SHIPPED | OUTPATIENT
Start: 2024-04-15

## 2024-05-06 ENCOUNTER — OFFICE VISIT (OUTPATIENT)
Dept: ORTHOPEDIC SURGERY | Facility: CLINIC | Age: 59
End: 2024-05-06
Payer: COMMERCIAL

## 2024-05-06 VITALS — WEIGHT: 232 LBS | BODY MASS INDEX: 36.41 KG/M2 | HEIGHT: 67 IN

## 2024-05-06 DIAGNOSIS — M19.049 CMC ARTHRITIS: Primary | ICD-10-CM

## 2024-05-06 PROCEDURE — 3008F BODY MASS INDEX DOCD: CPT | Performed by: ORTHOPAEDIC SURGERY

## 2024-05-06 PROCEDURE — 1036F TOBACCO NON-USER: CPT | Performed by: ORTHOPAEDIC SURGERY

## 2024-05-06 PROCEDURE — 20600 DRAIN/INJ JOINT/BURSA W/O US: CPT | Performed by: ORTHOPAEDIC SURGERY

## 2024-05-06 PROCEDURE — 99213 OFFICE O/P EST LOW 20 MIN: CPT | Performed by: ORTHOPAEDIC SURGERY

## 2024-05-06 RX ORDER — LIDOCAINE HYDROCHLORIDE 10 MG/ML
0.5 INJECTION INFILTRATION; PERINEURAL
Status: COMPLETED | OUTPATIENT
Start: 2024-05-06 | End: 2024-05-06

## 2024-05-06 RX ORDER — TRIAMCINOLONE ACETONIDE 40 MG/ML
20 INJECTION, SUSPENSION INTRA-ARTICULAR; INTRAMUSCULAR
Status: COMPLETED | OUTPATIENT
Start: 2024-05-06 | End: 2024-05-06

## 2024-05-06 RX ADMIN — LIDOCAINE HYDROCHLORIDE 0.5 ML: 10 INJECTION INFILTRATION; PERINEURAL at 16:33

## 2024-05-06 RX ADMIN — TRIAMCINOLONE ACETONIDE 20 MG: 40 INJECTION, SUSPENSION INTRA-ARTICULAR; INTRAMUSCULAR at 16:33

## 2024-05-06 ASSESSMENT — PAIN - FUNCTIONAL ASSESSMENT: PAIN_FUNCTIONAL_ASSESSMENT: NO/DENIES PAIN

## 2024-05-06 NOTE — PROGRESS NOTES
[facility]  Hand and Upper Extremity Service  Follow up visit         Follow up for: Bilateral hand injection    Interval History: Received bilateral thumb CMC joint injections on last visit. She had one month of relief on the left side and right side helped more. She has documented left thumb CMC joint arthritis with instability and she did have a procedure done by Dr. Stefano Tinsley on the left side in which he simply went in and removed a loose body from the joint but didn't do any stabilization or reconstruction efforts.               Past medical history, medications, allergies, surgical history and review of systems are reviewed and otherwise unchanged when compared to last visit on 2/5/24.         Examination:  Constitutional: Oriented to person, place, and time.  Appears well-developed and well-nourished.  Head: Normocephalic and atraumatic.  Eyes: Pupils are equal, round, and reactive to light.  Cardiovascular: Intact distal pulses.  Pulmonary/Chest/Breast: Effort normal. No respiratory distress.  Neurological: Alert and oriented to person, place, and time.  Skin: Skin is warm and dry.  Psychiatric: normal mood and affect.  Behavior is normal.  Musculoskeletal: Bilateral hands reveal thumb CMC joint arthritic changes with gross instability of left thumb CMC joint. Positive CMC grind test and crepitus. No MP joint instability.               Personal Interpretation of Diagnostic studies: No new images obtained           Impression: Bilateral thumb CMC joint arthritis          Plan: She has requested repeat injections today and we discussed surgery as a potential option but she's not ready to consider that. We also discussed the somewhat limited evidence in support of PRP joint injections into the arthritic CMC joints and that may be something she can consider doing if these injections are not helpful for her. She was given information on how to contact Dr. Plunkett should she  consider PRP injections but advised this would be an out-of-pocket expense for her.           In Office Procedures Performed: Bilateral thumb CMC joint injections   S Inj/Asp: bilateral thumb CMC on 5/6/2024 4:33 PM  Indications: pain  Details: 25 G needle, dorsal approach  Medications (Right): 20 mg triamcinolone acetonide 40 mg/mL; 0.5 mL lidocaine 10 mg/mL (1 %)  Medications (Left): 20 mg triamcinolone acetonide 40 mg/mL; 0.5 mL lidocaine 10 mg/mL (1 %)  Outcome: tolerated well, no immediate complications  Procedure, treatment alternatives, risks and benefits explained, specific risks discussed. Consent was given by the patient. Immediately prior to procedure a time out was called to verify the correct patient, procedure, equipment, support staff and site/side marked as required. Patient was prepped and draped in the usual sterile fashion.             Medications Prescribed: None               Follow up: As needed             Sammy Felix MD  [facility]  Department of Orthopaedic Surgery  Hand and Upper Extremity Reconstruction    Scribe Attestation  By signing my name below, ILaya , Scribe   attest that this documentation has been prepared under the direction and in the presence of Dr. Sammy Felix.    Dictation performed with the use of voice recognition software.  Syntax and grammatical errors may exist.

## 2024-05-15 ENCOUNTER — TELEMEDICINE (OUTPATIENT)
Dept: BEHAVIORAL HEALTH | Facility: CLINIC | Age: 59
End: 2024-05-15
Payer: COMMERCIAL

## 2024-05-15 DIAGNOSIS — F41.8 ANXIETY WITH SOMATIC FEATURES: ICD-10-CM

## 2024-05-15 DIAGNOSIS — F42.8 OTHER OBSESSIVE-COMPULSIVE DISORDERS: ICD-10-CM

## 2024-05-15 DIAGNOSIS — G47.09 OTHER INSOMNIA: ICD-10-CM

## 2024-05-15 DIAGNOSIS — F33.1 MODERATE EPISODE OF RECURRENT MAJOR DEPRESSIVE DISORDER (MULTI): ICD-10-CM

## 2024-05-15 PROCEDURE — 1036F TOBACCO NON-USER: CPT | Performed by: CLINICAL NURSE SPECIALIST

## 2024-05-15 PROCEDURE — 3008F BODY MASS INDEX DOCD: CPT | Performed by: CLINICAL NURSE SPECIALIST

## 2024-05-15 PROCEDURE — 99213 OFFICE O/P EST LOW 20 MIN: CPT | Performed by: CLINICAL NURSE SPECIALIST

## 2024-05-15 NOTE — PROGRESS NOTES
Outpatient Psychiatry      Subjective   Rachel Gomez, is a 58 y.o. female,  seen in follow-up.      Assessment/Plan   Problem List Items Addressed This Visit             ICD-10-CM    Anxiety with somatic features F41.8     Tolerating and benefiting from current regimen. Insomnia has largely improved since last visit. Took doxepin PRN twice with good benefit.   Continue doxepin 10 mg PO at bedtime PRN- no refill needed today.    Continue sertraline 200 mg PO daily- no refill needed today.    Aware that I can assist with referrals for individual or family therapy.          Relevant Orders    Follow Up In Psychiatry    Moderate episode of recurrent major depressive disorder (Multi) F33.1     Overall improvement in mood sx since last visit, likely r/t reduction in psychosocial stressors (e.g. dynamics with kids). Tolerating and benefiting from current regimen. No indication for medication changes today.    Continue lamotrigine 200 mg PO daily- no refill needed today.    Continue sertraline 200 mg PO daily- no refill needed today.      Advised of potentially fatal rash (i.e. Saucedo-Julio Cesar Syndrome) in 1:10,000 individuals. Agrees to stop medication and seek medical attention immediately if rash or blistering of the mucosal surfaces develops; also discussed risk for benign drug-induced rash, and advised to cease medication and seek immediate medical attention to r/o SJS given its potential lethality. Further advised that the risk for this reaction increases if proper dose titration is not followed, and that after 2 days of non-adherence, re-titration is necessary.          Relevant Orders    Follow Up In Psychiatry    Obsessive-compulsive disorder F42.9     Plan as noted above.          Relevant Orders    Follow Up In Psychiatry    Other insomnia G47.09     Doxepin PRN as noted above.          Relevant Orders    Follow Up In Psychiatry       Follow-up in 3 months.    Risk Assessment:  Risk Assessment: Rachel HASKINS  "Jason is currently a low acute risk of suicide and self-harm due to no past suicide attempt(s) and not currently endorsing thoughts of suicide. Rachel Gomez is currently a low acute risk of violence and harm to others due to no past history of violence and not currently threatening others.  Suicidal Risk Factors:  and current psychiatric illness  Violence Risk Factors: none  Protective Factors: strong coping skills, sense of responsibility towards family, social support/connectedness, moral objections to suicide, positive family relationships, hopefulness/future orientation, and marriage/partnership      Reason for Visit:  Follow-up for medication management.     HPI:  Since her last visit,     Doing pretty well.    Feeling a little bit better.  Being able to get outside has helped a lot.     Anxiety has gone down a lot.  Son got a job ~1 month ago. Planning to move in with a friend next month.  One daughter moved out recently, and the other will be moving out soon as well.  They'll all be living nearby.     Sleeping well.   Has taken doxepin PRN twice- helpful when she did, no side effects noted.   Still frequently wakes up to go to the bathroom, but can get back to sleep more easily so has not been as much of a concern.      Mood has been, \"good.\"   Not so much fluctuation lately.   Attributes to lower stress at home.     Denies suicidal ideation or a passive death wish.     No new medications or health problems. Pain and ROM in hands has been worse. Following with ortho. Recommending surgery, but hopes to defer until late next year with palliative measures if possible.       Current Psychiatric Medications:  Lamotrigine 200 mg PO daily  Sertraline 200 mg PO daily  Doxepin 10 mg PO at bedtime PRN       Record Review: brief     Medical Review Of Systems:  Pertinent items are noted in HPI.    Psychiatric Review Of Systems:  As noted in HPI.        Objective   Mental Status Exam  General Appearance: " "Well groomed, appropriate eye contact  Attitude/Behavior: Cooperative  Motor: No psychomotor agitation or retardation, no tremor or other abnormal movements  Speech: Normal rate, volume, prosody  Gait/Station: Other:(comment) (not observed- virtual)  Mood: \"Good.\"  Affect: Euthymic, full-range, Congruent with mood and topic of conversation  Thought Process: Linear, goal directed  Thought Associations: No loosening of associations  Thought Content: Other: (comment) (fewer anxious themes, especially r/t dynamics with kids.)  Perception: No perceptual abnormalities noted  Sensorium: Alert and oriented to person, place, time and situation  Insight: Intact  Judgement: Intact  Cognition: Cognitively intact to conversational testing with respect to attention, orientation, fund of knowledge, recent and remote memory, and language    Vitals:  There were no vitals filed for this visit.    Labs:  not applicable    Dominique Hoang, APRN-CNP, APRN-CNS  "

## 2024-05-15 NOTE — ASSESSMENT & PLAN NOTE
Overall improvement in mood sx since last visit, likely r/t reduction in psychosocial stressors (e.g. dynamics with kids). Tolerating and benefiting from current regimen. No indication for medication changes today.    Continue lamotrigine 200 mg PO daily- no refill needed today.    Continue sertraline 200 mg PO daily- no refill needed today.      Advised of potentially fatal rash (i.e. Saucedo-Julio Cesar Syndrome) in 1:10,000 individuals. Agrees to stop medication and seek medical attention immediately if rash or blistering of the mucosal surfaces develops; also discussed risk for benign drug-induced rash, and advised to cease medication and seek immediate medical attention to r/o SJS given its potential lethality. Further advised that the risk for this reaction increases if proper dose titration is not followed, and that after 2 days of non-adherence, re-titration is necessary.

## 2024-05-15 NOTE — ASSESSMENT & PLAN NOTE
Tolerating and benefiting from current regimen. Insomnia has largely improved since last visit. Took doxepin PRN twice with good benefit.   Continue doxepin 10 mg PO at bedtime PRN- no refill needed today.    Continue sertraline 200 mg PO daily- no refill needed today.    Aware that I can assist with referrals for individual or family therapy.

## 2024-05-17 ENCOUNTER — APPOINTMENT (OUTPATIENT)
Dept: RHEUMATOLOGY | Facility: CLINIC | Age: 59
End: 2024-05-17
Payer: COMMERCIAL

## 2024-05-20 ENCOUNTER — OFFICE VISIT (OUTPATIENT)
Dept: RHEUMATOLOGY | Facility: CLINIC | Age: 59
End: 2024-05-20
Payer: COMMERCIAL

## 2024-05-20 VITALS
SYSTOLIC BLOOD PRESSURE: 110 MMHG | DIASTOLIC BLOOD PRESSURE: 72 MMHG | TEMPERATURE: 98 F | OXYGEN SATURATION: 97 % | HEIGHT: 67 IN | HEART RATE: 65 BPM | BODY MASS INDEX: 36.85 KG/M2 | WEIGHT: 234.8 LBS

## 2024-05-20 DIAGNOSIS — M19.049 CMC ARTHRITIS: Primary | ICD-10-CM

## 2024-05-20 DIAGNOSIS — N31.8 HYPERACTIVITY OF BLADDER: ICD-10-CM

## 2024-05-20 DIAGNOSIS — M15.9 OSTEOARTHRITIS OF MULTIPLE JOINTS, UNSPECIFIED OSTEOARTHRITIS TYPE: ICD-10-CM

## 2024-05-20 PROCEDURE — 1036F TOBACCO NON-USER: CPT | Performed by: INTERNAL MEDICINE

## 2024-05-20 PROCEDURE — 3074F SYST BP LT 130 MM HG: CPT | Performed by: INTERNAL MEDICINE

## 2024-05-20 PROCEDURE — 3078F DIAST BP <80 MM HG: CPT | Performed by: INTERNAL MEDICINE

## 2024-05-20 PROCEDURE — 3008F BODY MASS INDEX DOCD: CPT | Performed by: INTERNAL MEDICINE

## 2024-05-20 PROCEDURE — 99214 OFFICE O/P EST MOD 30 MIN: CPT | Performed by: INTERNAL MEDICINE

## 2024-05-20 PROCEDURE — RXMED WILLOW AMBULATORY MEDICATION CHARGE

## 2024-05-20 RX ORDER — MIRABEGRON 25 MG/1
25 TABLET, FILM COATED, EXTENDED RELEASE ORAL DAILY
Qty: 30 TABLET | Refills: 5 | Status: SHIPPED | OUTPATIENT
Start: 2024-05-20

## 2024-05-20 NOTE — PROGRESS NOTES
Subjective   Patient ID: Rachel Gomez is a 58 y.o. female who presents for Follow-up.    HPI 57-year-old right-handed female here for follow-up regarding OA of the first CMC joint of both hands.    She has a history of OA of the first CMC joint of both hands, left greater than right.  She had surgery on the left thumb basilar joint February 2022 by Dr. Karan Tinsley.  Procedure consisted of arthrotomy, biopsy of joint capsule, removal 2 loose bodies, excision of osteophyte/bone spur/bone cyst.     She still has persistent pain in the area.  She started meloxicam 15 mg daily April 2022 because Advil was not working.  She also takes omeprazole 20 mg daily for GERD, which is currently well controlled.  She tried to wean the meloxicam, but pain worsened.  She does get some definite relief from the meloxicam.    She saw Dr. Felix for second opinion November 6, 2023.  He gave her a corticosteroid injection of both first CMC joints, which did give her some relief.  She had injections again 5/06/24- right is slightly better, left did not help.  He suggested that she consider PRP injections from Dr. Plunkett.  He told her she can do this every 3 months, but he is trying to defer surgery at present.    She was previously told she may need a second surgical procedure, which involves a tendon transposition, for the left first CMC joint.     She had EGD April 2023 which showed small to medium size hiatal hernia, tortuous esophagus, gastritis, multiple gastric polyps.  Biopsies did not show any evidence of Veloz's esophagus.    She still gets up 2-4 times at nighttime to urinate.  She does not go as frequently during the daytime.  She drinks 1 amount of caffeinated coffee in the morning.  She sometimes drinks 1 cola with caffeine at lunch or dinner.  She has not think that she drinks that much fluid in the evening.     Labs 9/20: ALFONZO negative, rheumatoid factor negative, citrulline antibody negative, ESR 25, CRP 0.88  "(normal less than 1), uric acid 4.8  Labs December 2021: TSH 0.38  Labs 2/22: Hemoglobin 11.7, hematocrit 37.6, WBC 5.5, platelets 203, CMP normal, 25-hydroxy vitamin D 27  Labs February 2023: ALFONZO negative, rheumatoid factor negative, Citrulline antibody negative, ESR 29, CRP 0.54, 25-hydroxy vitamin D 66, cholesterol 166, HDL 58, LDL 85, triglycerides 112  Labs July 2023: 25-hydroxy vitamin D 66  Labs August 2023: CMP normal except glucose 103  Labs February 2024: CMP normal except alkaline phosphatase 121 and glucose 100, CBC normal     Medical problem list:   -Migraine headaches   -Bipolar disorder    -Hypothyroidism   -Hyperlipidemia   - GERD     Medications: Reviewed as documented  Surgeries: Vaginal hysterectomy    Resection of pituitary macroadenoma 2012     Allergies: Reviewed as documented     Social history: .   Denies use of tobacco or alcohol.   Occupation: Homemaker.     Family history: Mother-arthritis    ROS:  General: Denies fevers or chills.  CV: Denies chest pain or palpitations.  Denies leg edema.  Lungs: Denies coughing or shortness of breath.  Skin: Denies rashes or nodules.  MS: Denies joint pain or joint swelling.   : She complains of urinary frequency.  She goes about 12 times during the day, and she needs to get up 2-3 times at night to urinate.  She does not drink much fluid in the evening.  She drinks 1 cup of caffeinated coffee in the morning.    Objective   /72 (BP Location: Left arm, Patient Position: Sitting, BP Cuff Size: Large adult)   Pulse 65   Temp 36.7 °C (98 °F)   Ht 1.702 m (5' 7\")   Wt 107 kg (234 lb 12.8 oz)   SpO2 97%   BMI 36.77 kg/m²     Physical Exam  HEENT: PERRL, EOMI  Neck: Supple, no nodes.  CV: RRR, no MGR.  Lungs: Clear, no rales or wheezes.  Abdomen: Soft, nontender. No hepatosplenomegaly.  Extremities:  No cyanosis, clubbing, or edema.  MS: No synovitis.  Bony prominence of first CMC joint bilaterally, consistent with OA.  Skin: No rashes or " nodules.      Assessment/Plan   Problem List Items Addressed This Visit             ICD-10-CM    CMC arthritis - Primary M19.049    Osteoarthritis M19.90    Relevant Orders    Comprehensive Metabolic Panel    BMI 36.0-36.9,adult Z68.36     Other Visit Diagnoses         Codes    Hyperactivity of bladder     N31.8    Relevant Medications    mirabegron (Myrbetriq) 25 mg tablet extended release 24 hr 24 hr tablet                OA left 1st CMC - s/p surgical procedure February 2022 consisting of arthrotomy, biopsy of joint capsule, removal 2 loose bodies, excision of osteophyte/bone spur/bone cyst over the dorsal base of first metacarpal of the left thumb. She has had persistent pain since the procedure. She was not getting relief with Advil. Meloxicam does help.  She had injection of first CMC joint bilaterally November 6, 2023 and 5/24 by Dr. Felix, which also helped (at last on right).   She has been told these can be reinjected every 3 months . She will consider PRP injections from Dr. Plunkett.    GERD-well-controlled with omeprazole.    Urinary frequency- occasional overflow incontinence. ? Hyperactive bladder.    Plan:  Start Myrbetriq 25 mg daily.  Check labs 8/24: CMP.  Follow-up in 3 months.

## 2024-05-21 DIAGNOSIS — M19.90 UNSPECIFIED OSTEOARTHRITIS, UNSPECIFIED SITE: ICD-10-CM

## 2024-05-21 RX ORDER — MELOXICAM 15 MG/1
15 TABLET ORAL DAILY PRN
Qty: 90 TABLET | Refills: 1 | Status: SHIPPED | OUTPATIENT
Start: 2024-05-21

## 2024-05-23 ENCOUNTER — SPECIALTY PHARMACY (OUTPATIENT)
Dept: PHARMACY | Facility: CLINIC | Age: 59
End: 2024-05-23

## 2024-05-24 ENCOUNTER — PHARMACY VISIT (OUTPATIENT)
Dept: PHARMACY | Facility: CLINIC | Age: 59
End: 2024-05-24
Payer: COMMERCIAL

## 2024-07-02 ENCOUNTER — HOSPITAL ENCOUNTER (OUTPATIENT)
Dept: RADIOLOGY | Facility: EXTERNAL LOCATION | Age: 59
Discharge: HOME | End: 2024-07-02

## 2024-07-02 ENCOUNTER — TELEPHONE (OUTPATIENT)
Dept: PRIMARY CARE | Facility: CLINIC | Age: 59
End: 2024-07-02
Payer: COMMERCIAL

## 2024-07-02 DIAGNOSIS — Z12.31 BREAST CANCER SCREENING BY MAMMOGRAM: ICD-10-CM

## 2024-07-02 DIAGNOSIS — Z12.31 BREAST CANCER SCREENING BY MAMMOGRAM: Primary | ICD-10-CM

## 2024-07-02 NOTE — TELEPHONE ENCOUNTER
Patient is trying to schedule Mammogram at [facility], but they need a physical order faxed in order for patient to schedule appt. Please call patient when faxed, so she knows when she schedule appt.

## 2024-07-03 NOTE — TELEPHONE ENCOUNTER
Pt called back stating SW did not receive the order. Please advise a faxed order over to [phone]. If possible please contact pt when it is confirmed it is confirmed it has been received. Thank you!

## 2024-07-08 ENCOUNTER — LAB (OUTPATIENT)
Dept: LAB | Facility: LAB | Age: 59
End: 2024-07-08
Payer: COMMERCIAL

## 2024-07-08 DIAGNOSIS — E55.9 VITAMIN D DEFICIENCY: ICD-10-CM

## 2024-07-08 DIAGNOSIS — E03.9 HYPOTHYROIDISM, UNSPECIFIED TYPE: ICD-10-CM

## 2024-07-08 LAB
25(OH)D3 SERPL-MCNC: 40 NG/ML (ref 30–100)
T4 FREE SERPL-MCNC: 0.81 NG/DL (ref 0.61–1.12)

## 2024-07-08 PROCEDURE — 36415 COLL VENOUS BLD VENIPUNCTURE: CPT

## 2024-07-08 PROCEDURE — 82306 VITAMIN D 25 HYDROXY: CPT

## 2024-07-08 PROCEDURE — 84439 ASSAY OF FREE THYROXINE: CPT

## 2024-07-10 ENCOUNTER — APPOINTMENT (OUTPATIENT)
Dept: ENDOCRINOLOGY | Facility: CLINIC | Age: 59
End: 2024-07-10
Payer: COMMERCIAL

## 2024-07-10 VITALS
SYSTOLIC BLOOD PRESSURE: 140 MMHG | DIASTOLIC BLOOD PRESSURE: 84 MMHG | HEIGHT: 67 IN | WEIGHT: 240 LBS | BODY MASS INDEX: 37.67 KG/M2

## 2024-07-10 DIAGNOSIS — D35.2 PITUITARY ADENOMA (MULTI): ICD-10-CM

## 2024-07-10 DIAGNOSIS — E03.8 OTHER SPECIFIED HYPOTHYROIDISM: Primary | ICD-10-CM

## 2024-07-10 DIAGNOSIS — E55.9 VITAMIN D DEFICIENCY: ICD-10-CM

## 2024-07-10 PROCEDURE — 3077F SYST BP >= 140 MM HG: CPT | Performed by: STUDENT IN AN ORGANIZED HEALTH CARE EDUCATION/TRAINING PROGRAM

## 2024-07-10 PROCEDURE — 3008F BODY MASS INDEX DOCD: CPT | Performed by: STUDENT IN AN ORGANIZED HEALTH CARE EDUCATION/TRAINING PROGRAM

## 2024-07-10 PROCEDURE — 99214 OFFICE O/P EST MOD 30 MIN: CPT | Performed by: STUDENT IN AN ORGANIZED HEALTH CARE EDUCATION/TRAINING PROGRAM

## 2024-07-10 PROCEDURE — 3079F DIAST BP 80-89 MM HG: CPT | Performed by: STUDENT IN AN ORGANIZED HEALTH CARE EDUCATION/TRAINING PROGRAM

## 2024-07-10 NOTE — PROGRESS NOTES
"Subjective   Patient ID: Rachel Gomez is a 58 y.o. female who presents for Hypothyroidism (Rachel Gomez is a 58 y.o. female who presents for Hypothyroidism (PCP:Gross /F /Synthdavid SINGH through synthroid direct pharmacy /Recent labs done 7/8/24/) no missed doses and taking correctly)   Lab Results   Component Value Date    TSH 0.04 (L) 02/12/2024      HPI  The patient is a 57 yo female with PMH of pituitary macroadenoma s/p TSS 10 years ago at [facility] ( pathology unknown),secondary hypothyroidism . She present today for regular follow up.  She has ongoing left hand pain , she is planning for surgery early next years    Pituitary adenoma history :  She followed with Endocrinology at [facility] , she previously followed with Dr. Rausch and Dr. cannon  Diagnosed with hypothyroidism after her TSS  She was started on LT4 replacement after her TSS      She had a hysterectomy 10 year ago for uterine prolapse , Ovaries were not removed.    Review of Systems    Objective   Physical Exam  Constitutional:       Appearance: Normal appearance.   Cardiovascular:      Rate and Rhythm: Normal rate and regular rhythm.   Pulmonary:      Effort: Pulmonary effort is normal.      Breath sounds: Normal breath sounds.   Neurological:      Mental Status: She is alert.      Visit Vitals  /84   Ht 1.702 m (5' 7\")   Wt 109 kg (240 lb)   BMI 37.59 kg/m²   OB Status Hysterectomy   Smoking Status Never   BSA 2.27 m²        Assessment/Plan        The patient is a 57 yo female with PMH of pituitary macroadenoma s/p TSS 10 years ago at [facility] ( pathology unknown) with secondary hypothyroidism .       -Pt with secondary hypothyroidism on replacement , biochemically and clinically euthyroid on Synthroid 137 mcg through  delivery on Lt4  6.5 pills /week     - ongoing overheating/hot flash advised discuss SNRI venaflaxine with her phychiatrist.    -No symptoms of AI or DI , no other hormonal deficiencies detected   -Migraine headaches " , follows with neurology , on Botox and is on Emglaity.  -chronic GERD on PPI for ~ 10 years   - vitamin D deficiency finished vitamin D 50,000 IU weekly , now takes  D3 10,000 international  once a week      RTC in 6 months with labs

## 2024-07-15 ENCOUNTER — OFFICE VISIT (OUTPATIENT)
Dept: ORTHOPEDIC SURGERY | Facility: HOSPITAL | Age: 59
End: 2024-07-15
Payer: COMMERCIAL

## 2024-07-15 VITALS — BODY MASS INDEX: 36.1 KG/M2 | HEIGHT: 67 IN | WEIGHT: 230 LBS

## 2024-07-15 DIAGNOSIS — M18.0 ARTHRITIS OF CARPOMETACARPAL (CMC) JOINT OF BOTH THUMBS: Primary | ICD-10-CM

## 2024-07-15 PROCEDURE — 99203 OFFICE O/P NEW LOW 30 MIN: CPT | Performed by: EMERGENCY MEDICINE

## 2024-07-15 PROCEDURE — 99213 OFFICE O/P EST LOW 20 MIN: CPT | Performed by: EMERGENCY MEDICINE

## 2024-07-15 PROCEDURE — 3008F BODY MASS INDEX DOCD: CPT | Performed by: EMERGENCY MEDICINE

## 2024-07-15 PROCEDURE — 1036F TOBACCO NON-USER: CPT | Performed by: EMERGENCY MEDICINE

## 2024-07-15 ASSESSMENT — PAIN SCALES - GENERAL: PAINLEVEL_OUTOF10: 5 - MODERATE PAIN

## 2024-07-15 ASSESSMENT — PAIN - FUNCTIONAL ASSESSMENT: PAIN_FUNCTIONAL_ASSESSMENT: 0-10

## 2024-07-15 NOTE — PROGRESS NOTES
"Subjective   Rachel Gomez is a 58 y.o. female who presents for Pain of the Left Hand and Pain of the Right Hand    HPI  58-year-old right-hand-dominant female referred by Dr. Felix presents with complaint of bilateral thumb pain that she is had for quite some time but progressive worse over the past 2 weeks.  Patient has known bilateral thumb CMC joint DJD with left greater than right.  She did have a procedure with Dr. Tinsley in 2022.  She does continue to have significant pain.  She has had multiple corticosteroid injections, most recent injections performed proximally 2 months ago.  She has been having diminished response from these injections and would like to avoid surgery, therefore she was referred to discuss additional treatment options, specifically PRP.    ROS: All pertinent positive symptoms are included in the history of present illness.    All other systems have been reviewed and are negative and noncontributory to this patient's current ailments.    Objective     Vitals:    07/15/24 1152   Weight: 104 kg (230 lb)   Height: 1.702 m (5' 7\")       Physical Exam  General/Constitutional: No apparent distress. Well-nourished and well developed.  Head: Normocephalic, Atraumatic.   Eyes: EOMI.  Vascular: No edema, swelling or tenderness, except as noted in detailed exam.  Respiratory: Non-labored breathing.  Integumentary: No impressive skin lesions present, except as noted in detailed exam.  Neurological: Alert and oriented.  Psychological:  Normal mood and affect.  Musculoskeletal: Normal, except as noted in detailed exam.  Right hand: No rash.  No deformity.  No erythema.  Full  strength.  Gross sensation intact throughout.  Tenderness over the first CMC joint with palpable crepitus.  Left hand: No rash.  No deformity.  No erythema.  Full  strength.  Well-healed surgical incision.  Gross sensation intact throughout.  Tenderness over the first CMC joint with palpable crepitus.    Imaging:   I " have personally reviewed and agree with Radiologist interpretation of previous imaging studies performed prior to this visit.   STUDY:  THUMB, MIN 2 VIEWS     INDICATION:  XRAY LT THUMB BJ ARTHRITIS.     COMPARISON:  None     ACCESSION NUMBER(S):  [id number]     ORDERING CLINICIAN:  CHICO ESCOBEDO     FINDINGS:  Advanced left 1st carpometacarpal osteoarthritis. No evidence of  fracture. No osseous lesion.     IMPRESSION:  Advanced left 1st carpometacarpal osteoarthritis.    STUDY:  HAND  MIN 3 VIEWS;  2/23/2022 12:53 pm     INDICATION:  pain right 4th toe  M79.673: Foot pain.     COMPARISON:  09/23/2020     ACCESSION NUMBER(S):  [id number]     ORDERING CLINICIAN:  LAUREL HAILE     FINDINGS:  Right hand, three views     There is no fracture. There is no dislocation. No significant  degenerative changes seen.     IMPRESSION:  Unremarkable radiographs of the  right hand    Assessment/Plan   Problem List Items Addressed This Visit    None  Visit Diagnoses       Arthritis of carpometacarpal (CMC) joint of both thumbs    -  Primary          I discussed with patient and agree that her pain is likely due to advanced CMC joint DJD of her thumbs bilaterally.  Considering that she has had weaning response from the corticosteroid injections, we did discuss the option of therapeutic PRP injections.  We discussed the risks versus benefits.  Have given her additional information for this.  She will consider this and likely proceed with bilateral thumb CMC joint PRP injections in the near future.  In the meantime, she may consider repeat corticosteroid junction therapy.  I am more than happy to proceed with PRP injection therapy for her if she would like.    Dontrell Plunkett, DO  Sports Medicine  [facility], [facility]    ** Please excuse any errors in grammar or translation related to this dictation. Voice recognition software was utilized to prepare this document. **

## 2024-08-06 ENCOUNTER — OFFICE VISIT (OUTPATIENT)
Dept: ORTHOPEDIC SURGERY | Facility: HOSPITAL | Age: 59
End: 2024-08-06
Payer: COMMERCIAL

## 2024-08-06 VITALS — WEIGHT: 230 LBS | BODY MASS INDEX: 36.1 KG/M2 | HEIGHT: 67 IN

## 2024-08-06 DIAGNOSIS — M18.0 ARTHRITIS OF CARPOMETACARPAL (CMC) JOINT OF BOTH THUMBS: Primary | ICD-10-CM

## 2024-08-06 PROCEDURE — 1036F TOBACCO NON-USER: CPT | Performed by: ORTHOPAEDIC SURGERY

## 2024-08-06 PROCEDURE — 2500000005 HC RX 250 GENERAL PHARMACY W/O HCPCS: Performed by: ORTHOPAEDIC SURGERY

## 2024-08-06 PROCEDURE — 3008F BODY MASS INDEX DOCD: CPT | Performed by: ORTHOPAEDIC SURGERY

## 2024-08-06 PROCEDURE — 20600 DRAIN/INJ JOINT/BURSA W/O US: CPT | Mod: 50 | Performed by: ORTHOPAEDIC SURGERY

## 2024-08-06 PROCEDURE — 99213 OFFICE O/P EST LOW 20 MIN: CPT | Performed by: ORTHOPAEDIC SURGERY

## 2024-08-06 PROCEDURE — 2500000004 HC RX 250 GENERAL PHARMACY W/ HCPCS (ALT 636 FOR OP/ED): Performed by: ORTHOPAEDIC SURGERY

## 2024-08-06 RX ORDER — LIDOCAINE HYDROCHLORIDE 10 MG/ML
0.5 INJECTION INFILTRATION; PERINEURAL
Status: COMPLETED | OUTPATIENT
Start: 2024-08-06 | End: 2024-08-06

## 2024-08-06 RX ORDER — TRIAMCINOLONE ACETONIDE 40 MG/ML
20 INJECTION, SUSPENSION INTRA-ARTICULAR; INTRAMUSCULAR
Status: COMPLETED | OUTPATIENT
Start: 2024-08-06 | End: 2024-08-06

## 2024-08-06 ASSESSMENT — PAIN - FUNCTIONAL ASSESSMENT: PAIN_FUNCTIONAL_ASSESSMENT: 0-10

## 2024-08-06 ASSESSMENT — PAIN SCALES - GENERAL: PAINLEVEL_OUTOF10: 5 - MODERATE PAIN

## 2024-08-06 ASSESSMENT — PAIN DESCRIPTION - DESCRIPTORS: DESCRIPTORS: ACHING;SORE

## 2024-08-06 NOTE — PROGRESS NOTES
Patient returns for her bilateral thumbs.  She has left worse than right bilateral thumb CMC joint arthritis.  She previously had an open debridement of the left thumb CMC joint by Dr. Tinsley several years ago and this did not improve any of her symptoms.  She continues to have clicking and grinding and pain.  Injections are providing her with diminishing returns.  She did have a consultation with Dr. Plunkett regarding PRP injections and will consider that as a treatment option after injections of steroid today.    Past medical history, medications, allergies, surgical history and review of systems have been reviewed with the patient. Pertinent changes are documented in the HPI. Otherwise they are unchanged when compared to last visit on May 6, 2024.    Physical Examination Findings:  Constitutional: Appears well-developed and well-nourished.  Head: Normocephalic and atraumatic.  Eyes: Pupils are equal and round.  Cardiovascular: Intact distal pulses.   Respiratory: Effort normal. No respiratory distress.  Neurologic: Alert and oriented to person, place, and time.  Skin: Skin is warm and dry.  Hematologic / Lymphatic: No lymphedema, lymphangitis.  Psychiatric: normal mood and affect. Behavior is normal.   Musculoskeletal: Bilateral hand examination reveals healed surgical incision over the left thumb CMC joint.  Positive CMC grind test with crepitus bilaterally.  No MP joint instability.  No thenar atrophy.  Sensation subjectively normal.    Impression: Bilateral thumb CMC arthritis.    Plan: Patient familiar with treatment options and has requested repeat injections today.  She will then make decisions whether or not she wants to proceed with PRP injections with Dr. Plunkett.  She knows that she needs to wait at least 1 month after steroid injection before PRP injection.  We will tentatively make an appointment for her to come back and see me in 3 months for possible repeat injections if she decides not to proceed  with PRP injections but if she does get a PRP injection before then she will cancel her appointment with me.    S Inj/Asp: bilateral thumb CMC on 8/6/2024 11:26 AM  Indications: pain  Details: 25 G needle, dorsal approach  Medications (Right): 20 mg triamcinolone acetonide 40 mg/mL; 0.5 mL lidocaine 10 mg/mL (1 %)  Medications (Left): 20 mg triamcinolone acetonide 40 mg/mL; 0.5 mL lidocaine 10 mg/mL (1 %)  Outcome: tolerated well, no immediate complications  Procedure, treatment alternatives, risks and benefits explained, specific risks discussed. Consent was given by the patient. Immediately prior to procedure a time out was called to verify the correct patient, procedure, equipment, support staff and site/side marked as required. Patient was prepped and draped in the usual sterile fashion.             Sammy Felix MD    [facility] [employer]  Department of Orthopaedic Surgery  Chief of Hand and Upper Extremity Surgery  [facility]    Dictation performed with the use of voice recognition software. Syntax and grammatical errors may exist.

## 2024-08-13 DIAGNOSIS — G43.719 INTRACTABLE CHRONIC MIGRAINE WITHOUT AURA AND WITHOUT STATUS MIGRAINOSUS: Primary | ICD-10-CM

## 2024-08-15 ENCOUNTER — SPECIALTY PHARMACY (OUTPATIENT)
Dept: PHARMACY | Facility: CLINIC | Age: 59
End: 2024-08-15

## 2024-08-15 DIAGNOSIS — E03.9 HYPOTHYROIDISM, UNSPECIFIED TYPE: ICD-10-CM

## 2024-08-15 PROCEDURE — RXMED WILLOW AMBULATORY MEDICATION CHARGE

## 2024-08-15 RX ORDER — LEVOTHYROXINE SODIUM 137 UG/1
137 TABLET ORAL DAILY
Qty: 90 TABLET | Refills: 1 | Status: SHIPPED | OUTPATIENT
Start: 2024-08-15

## 2024-08-16 ENCOUNTER — LAB (OUTPATIENT)
Dept: LAB | Facility: LAB | Age: 59
End: 2024-08-16
Payer: COMMERCIAL

## 2024-08-16 ENCOUNTER — PHARMACY VISIT (OUTPATIENT)
Dept: PHARMACY | Facility: CLINIC | Age: 59
End: 2024-08-16
Payer: COMMERCIAL

## 2024-08-16 DIAGNOSIS — M15.9 OSTEOARTHRITIS OF MULTIPLE JOINTS, UNSPECIFIED OSTEOARTHRITIS TYPE: ICD-10-CM

## 2024-08-16 LAB
ALBUMIN SERPL BCP-MCNC: 4.2 G/DL (ref 3.4–5)
ALP SERPL-CCNC: 107 U/L (ref 33–110)
ALT SERPL W P-5'-P-CCNC: 11 U/L (ref 7–45)
ANION GAP SERPL CALC-SCNC: 12 MMOL/L (ref 10–20)
AST SERPL W P-5'-P-CCNC: 13 U/L (ref 9–39)
BILIRUB SERPL-MCNC: 0.4 MG/DL (ref 0–1.2)
BUN SERPL-MCNC: 22 MG/DL (ref 6–23)
CALCIUM SERPL-MCNC: 9.5 MG/DL (ref 8.6–10.3)
CHLORIDE SERPL-SCNC: 105 MMOL/L (ref 98–107)
CO2 SERPL-SCNC: 28 MMOL/L (ref 21–32)
CREAT SERPL-MCNC: 0.85 MG/DL (ref 0.5–1.05)
EGFRCR SERPLBLD CKD-EPI 2021: 80 ML/MIN/1.73M*2
GLUCOSE SERPL-MCNC: 87 MG/DL (ref 74–99)
POTASSIUM SERPL-SCNC: 4.5 MMOL/L (ref 3.5–5.3)
PROT SERPL-MCNC: 6.6 G/DL (ref 6.4–8.2)
SODIUM SERPL-SCNC: 140 MMOL/L (ref 136–145)

## 2024-08-16 PROCEDURE — 80053 COMPREHEN METABOLIC PANEL: CPT

## 2024-08-16 PROCEDURE — 36415 COLL VENOUS BLD VENIPUNCTURE: CPT

## 2024-08-20 ENCOUNTER — APPOINTMENT (OUTPATIENT)
Dept: RHEUMATOLOGY | Facility: CLINIC | Age: 59
End: 2024-08-20
Payer: COMMERCIAL

## 2024-08-20 VITALS
HEIGHT: 67 IN | RESPIRATION RATE: 16 BRPM | SYSTOLIC BLOOD PRESSURE: 119 MMHG | TEMPERATURE: 97.7 F | BODY MASS INDEX: 37.98 KG/M2 | HEART RATE: 66 BPM | OXYGEN SATURATION: 99 % | DIASTOLIC BLOOD PRESSURE: 69 MMHG | WEIGHT: 242 LBS

## 2024-08-20 DIAGNOSIS — M15.9 OSTEOARTHRITIS OF MULTIPLE JOINTS, UNSPECIFIED OSTEOARTHRITIS TYPE: Primary | ICD-10-CM

## 2024-08-20 PROCEDURE — 3008F BODY MASS INDEX DOCD: CPT | Performed by: INTERNAL MEDICINE

## 2024-08-20 PROCEDURE — 99214 OFFICE O/P EST MOD 30 MIN: CPT | Performed by: INTERNAL MEDICINE

## 2024-08-20 PROCEDURE — 1036F TOBACCO NON-USER: CPT | Performed by: INTERNAL MEDICINE

## 2024-08-20 PROCEDURE — 3078F DIAST BP <80 MM HG: CPT | Performed by: INTERNAL MEDICINE

## 2024-08-20 PROCEDURE — 3074F SYST BP LT 130 MM HG: CPT | Performed by: INTERNAL MEDICINE

## 2024-08-20 ASSESSMENT — PATIENT HEALTH QUESTIONNAIRE - PHQ9
2. FEELING DOWN, DEPRESSED OR HOPELESS: NOT AT ALL
SUM OF ALL RESPONSES TO PHQ9 QUESTIONS 1 AND 2: 0
1. LITTLE INTEREST OR PLEASURE IN DOING THINGS: NOT AT ALL

## 2024-08-20 NOTE — PROGRESS NOTES
Subjective   Patient ID: Rachel Gomez is a 58 y.o. female who presents for 3 month follow up.    HPI 57-year-old right-handed female here for follow-up regarding OA of the first CMC joint of both hands.    She has a history of OA of the first CMC joint of both hands, left greater than right.  She had surgery on the left thumb basilar joint February 2022 by Dr. Karan Tinsley.  Procedure consisted of arthrotomy, biopsy of joint capsule, removal 2 loose bodies, excision of osteophyte/bone spur/bone cyst.     She still has persistent pain in the area.  She started meloxicam 15 mg daily April 2022 because Advil was not working.  She also takes omeprazole 20 mg daily for GERD, which is currently well controlled.  She tried to wean the meloxicam, but pain worsened.  She does get some definite relief from the meloxicam.    She saw Dr. Felix for second opinion November 6, 2023.  He gave her a corticosteroid injection of both first CMC joints, which did give her some relief.  She had injections again 5/06/24- right is slightly better, left did not help.  He suggested that she consider PRP injections from Dr. Plunkett.    She had Kenalog injections of the first CMC joint bilaterally again August 6, 2024.  She is hoping to do PRP injections of this first CMC joint bilaterally in about 1 month with Dr. Plunkett.  She does ultimately she will need a second surgical procedure on her left hand, which she hopes to do January or February 2025.    She was previously told she may need a second surgical procedure, which involves a tendon transposition, for the left first CMC joint.     She had EGD April 2023 which showed small to medium size hiatal hernia, tortuous esophagus, gastritis, multiple gastric polyps.  Biopsies did not show any evidence of Veloz's esophagus.    Her symptoms of urinary frequency and needing to get up at nighttime to urinate have improved with addition of Myrbetriq .    labs 9/20: ALFONZO negative, rheumatoid  "factor negative, citrulline antibody negative, ESR 25, CRP 0.88 (normal less than 1), uric acid 4.8  Labs December 2021: TSH 0.38  Labs 2/22: Hemoglobin 11.7, hematocrit 37.6, WBC 5.5, platelets 203, CMP normal, 25-hydroxy vitamin D 27  Labs February 2023: ALFONZO negative, rheumatoid factor negative, Citrulline antibody negative, ESR 29, CRP 0.54, 25-hydroxy vitamin D 66, cholesterol 166, HDL 58, LDL 85, triglycerides 112  Labs July 2023: 25-hydroxy vitamin D 66  Labs August 2023: CMP normal except glucose 103  Labs February 2024: CMP normal except alkaline phosphatase 121 and glucose 100, CBC normal  Labs August 2024: CMP normal     Medical problem list:   -Migraine headaches   -Bipolar disorder    -Hypothyroidism   -Hyperlipidemia   - GERD     Medications: Reviewed as documented  Surgeries: Vaginal hysterectomy    Resection of pituitary macroadenoma 2012     Allergies: Reviewed as documented     Social history: .   Denies use of tobacco or alcohol.   Occupation: Homemaker.     Family history: Mother-arthritis    ROS:  General: Denies fevers or chills.  CV: Denies chest pain or palpitations.  Denies leg edema.  Lungs: Denies coughing or shortness of breath.  Skin: Denies rashes or nodules.  MS: c/o pain at the base of both thumbs.    Objective   /69 (BP Location: Left arm, Patient Position: Sitting, BP Cuff Size: Adult)   Pulse 66   Temp 36.5 °C (97.7 °F) (Skin)   Resp 16   Ht 1.702 m (5' 7\")   Wt 110 kg (242 lb)   SpO2 99%   BMI 37.90 kg/m²     Physical Exam  HEENT: PERRL, EOMI  Neck: Supple, no nodes.  CV: RRR, no MGR.  Lungs: Clear, no rales or wheezes.  Abdomen: Soft, nontender. No hepatosplenomegaly.  Extremities:  No cyanosis, clubbing, or edema.  MS: No synovitis.  Bony prominence of first CMC joint bilaterally, consistent with OA. Tender 1st CMC joint bilaterally.  Skin: No rashes or nodules.      Assessment/Plan   Problem List Items Addressed This Visit             ICD-10-CM    " Osteoarthritis - Primary M19.90     OA left 1st CMC - s/p surgical procedure February 2022 consisting of arthrotomy, biopsy of joint capsule, removal 2 loose bodies, excision of osteophyte/bone spur/bone cyst over the dorsal base of first metacarpal of the left thumb. She has had persistent pain since the procedure. She was not getting relief with Advil. Meloxicam does help.  She had injection of first CMC joint bilaterally November 6, 2023, 5/24, and 8/24  by Dr. Felix,  She is going to get PRP injections from Dr. Plunkett.  She is ultimately going to get surgery on left hand early 2025.    GERD-well-controlled with omeprazole.    Urinary frequency- better with Myrbetriq.    Plan:  Continue same medication.  Follow-up in 6 months.

## 2024-08-21 PROBLEM — R06.09 DYSPNEA ON EXERTION: Status: ACTIVE | Noted: 2024-08-21

## 2024-08-21 RX ORDER — CEFDINIR 300 MG/1
CAPSULE ORAL EVERY 12 HOURS
COMMUNITY
Start: 2022-11-08

## 2024-08-21 RX ORDER — CALCIUM CARBONATE 300MG(750)
TABLET,CHEWABLE ORAL
COMMUNITY

## 2024-08-22 ENCOUNTER — APPOINTMENT (OUTPATIENT)
Dept: BEHAVIORAL HEALTH | Facility: CLINIC | Age: 59
End: 2024-08-22
Payer: COMMERCIAL

## 2024-08-22 DIAGNOSIS — F42.8 OTHER OBSESSIVE-COMPULSIVE DISORDERS: ICD-10-CM

## 2024-08-22 DIAGNOSIS — F41.8 ANXIETY WITH SOMATIC FEATURES: ICD-10-CM

## 2024-08-22 DIAGNOSIS — G47.09 OTHER INSOMNIA: ICD-10-CM

## 2024-08-22 DIAGNOSIS — F33.1 MODERATE EPISODE OF RECURRENT MAJOR DEPRESSIVE DISORDER (MULTI): ICD-10-CM

## 2024-08-22 PROCEDURE — 99417 PROLNG OP E/M EACH 15 MIN: CPT | Performed by: CLINICAL NURSE SPECIALIST

## 2024-08-22 PROCEDURE — 99215 OFFICE O/P EST HI 40 MIN: CPT | Performed by: CLINICAL NURSE SPECIALIST

## 2024-08-22 PROCEDURE — 1036F TOBACCO NON-USER: CPT | Performed by: CLINICAL NURSE SPECIALIST

## 2024-08-22 NOTE — ASSESSMENT & PLAN NOTE
Tolerating and benefiting from current regimen. No indication for medication changes today.    Continue lamotrigine 200 mg PO daily- no refill needed today.    Continue sertraline as noted above.    Advised of potentially fatal rash (i.e. Saucedo-Julio Cesar Syndrome) in 1:10,000 individuals. Agrees to stop medication and seek medical attention immediately if rash or blistering of the mucosal surfaces develops; also discussed risk for benign drug-induced rash, and advised to cease medication and seek immediate medical attention to r/o SJS given its potential lethality. Further advised that the risk for this reaction increases if proper dose titration is not followed, and that after 2 days of non-adherence, re-titration is necessary.

## 2024-08-22 NOTE — PROGRESS NOTES
Outpatient Psychiatry      Subjective   Rachel Gomez, is a 58 y.o. female,  seen in follow-up.      Assessment/Plan   Problem List Items Addressed This Visit             ICD-10-CM    Anxiety with somatic features F41.8     Tolerating and benefiting from current regimen. Insomnia has largely improved- rarely taking doxepin PRN. Reviewed risk for weight gain from current medications. There have been no changes in dose this year of any of the psychotropic medications she is currently prescribed. Discussed that there is some risk for weight gain from sertraline, though this is still a fairly uncommon side effect, and timeline for emergence of weight changes is not typical given that she has been on the current dose for years. Encouraged to discuss weight changes with PCP and other specialists. Could always consider trialing an alternative to sertraline, or trialing off of the medication to evaluate further, but would need to carefully consider risks/benefits of doing so.     Continue doxepin 10 mg PO at bedtime PRN- no refill needed today.    Continue sertraline 200 mg PO daily- no refill needed today.             Relevant Orders    Follow Up In Psychiatry    Moderate episode of recurrent major depressive disorder (Multi) F33.1     Tolerating and benefiting from current regimen. No indication for medication changes today.    Continue lamotrigine 200 mg PO daily- no refill needed today.    Continue sertraline as noted above.    Advised of potentially fatal rash (i.e. Saucedo-Julio Cesar Syndrome) in 1:10,000 individuals. Agrees to stop medication and seek medical attention immediately if rash or blistering of the mucosal surfaces develops; also discussed risk for benign drug-induced rash, and advised to cease medication and seek immediate medical attention to r/o SJS given its potential lethality. Further advised that the risk for this reaction increases if proper dose titration is not followed, and that after 2 days of  non-adherence, re-titration is necessary.          Relevant Orders    Follow Up In Psychiatry    Obsessive-compulsive disorder F42.9     Plan as noted above.          Relevant Orders    Follow Up In Psychiatry    Other insomnia G47.09     Doxepin PRN as noted above.          Relevant Orders    Follow Up In Psychiatry       Follow-up in 3 months .    Risk Assessment:  Risk Assessment: Rachel Gomez is currently a low acute risk of suicide and self-harm due to no past suicide attempt(s) and not currently endorsing thoughts of suicide. Rachel Gomez is currently a low acute risk of violence and harm to others due to no past history of violence and not currently threatening others.  Suicidal Risk Factors:  and current psychiatric illness  Violence Risk Factors: none  Protective Factors: strong coping skills, sense of responsibility towards family, social support/connectedness, moral objections to suicide, positive family relationships, hopefulness/future orientation, and marriage/partnership      Reason for Visit:  Follow-up for medication management.     HPI:  Since her last visit,     Doing well.   All 3 kids have moved into their own places since last visit.   Son all of the sudden got a full time job and decided to move in with a friend.   Seems so happy now, complete turnaround from how he seemed over the winter.   Actually talked to her about the fact that he's struggled with panic and depression in the past.     Was a little anxious seeing all of them go, but they're all still nearby. Spend time together regularly .  Overall, anxiety has been better. Less stress over keeping the house clean, finances. She and  have been able to do some home improvement projects.     Concerned about weight gain.  Since February she's gained ~10-12 lbs.   No changes in diet, tends to be more active in the summer.   No medication changes.   Unsure what might be contributing, but concerned because of how  "rapidly she's put the weight on.     Best friend has cancer, and she's been going to treatments with her.   Has helped her in a way to see things differently- appreciates what she has, changes her perspective on problems she's going through.   Glad that she can be a support while her friend is going through this as well. They've been friends for 40+ years.     Denies suicidal ideation or a passive death wish.     No new medications or health problems.       Current Psychiatric Medications:  Lamotrigine 200 mg PO daily  Sertraline 200 mg PO daily  Doxepin 10 mg PO at bedtime PRN       Record Review: brief     Medical Review Of Systems:  Pertinent items are noted in HPI.    Psychiatric Review Of Systems:  As noted in HPI.        Objective   Mental Status Exam  General Appearance: Well groomed, appropriate eye contact  Attitude/Behavior: Cooperative  Motor: No psychomotor agitation or retardation, no tremor or other abnormal movements  Speech: Normal rate, volume, prosody  Gait/Station: Other:(comment) (not observed- virtual.)  Mood: \"I'm doing pretty well.\"  Affect: Congruent with mood and topic of conversation  Thought Process: Linear, goal directed  Thought Associations: No loosening of associations  Thought Content: Other: (comment) (overall fewer depressive or anxious themes. Concern for friend going through tx for CA.)  Perception: No perceptual abnormalities noted  Sensorium: Alert and oriented to person, place, time and situation  Insight: Intact  Judgement: Intact  Cognition: Cognitively intact to conversational testing with respect to attention, orientation, fund of knowledge, recent and remote memory, and language    Vitals:  There were no vitals filed for this visit.    Labs:  Lab on 08/16/2024   Component Date Value    Glucose 08/16/2024 87     Sodium 08/16/2024 140     Potassium 08/16/2024 4.5     Chloride 08/16/2024 105     Bicarbonate 08/16/2024 28     Anion Gap 08/16/2024 12     Urea Nitrogen " 08/16/2024 22     Creatinine 08/16/2024 0.85     eGFR 08/16/2024 80     Calcium 08/16/2024 9.5     Albumin 08/16/2024 4.2     Alkaline Phosphatase 08/16/2024 107     Total Protein 08/16/2024 6.6     AST 08/16/2024 13     Bilirubin, Total 08/16/2024 0.4     ALT 08/16/2024 11    Lab on 07/08/2024   Component Date Value    Thyroxine, Free 07/08/2024 0.81     Vitamin D, 25-Hydroxy, T* 07/08/2024 40          Dominique Hoang, APRN-CNP, APRN-CNS

## 2024-08-22 NOTE — ASSESSMENT & PLAN NOTE
Tolerating and benefiting from current regimen. Insomnia has largely improved- rarely taking doxepin PRN. Reviewed risk for weight gain from current medications. There have been no changes in dose this year of any of the psychotropic medications she is currently prescribed. Discussed that there is some risk for weight gain from sertraline, though this is still a fairly uncommon side effect, and timeline for emergence of weight changes is not typical given that she has been on the current dose for years. Encouraged to discuss weight changes with PCP and other specialists. Could always consider trialing an alternative to sertraline, or trialing off of the medication to evaluate further, but would need to carefully consider risks/benefits of doing so.     Continue doxepin 10 mg PO at bedtime PRN- no refill needed today.    Continue sertraline 200 mg PO daily- no refill needed today.

## 2024-10-16 ENCOUNTER — SPECIALTY PHARMACY (OUTPATIENT)
Dept: PHARMACY | Facility: CLINIC | Age: 59
End: 2024-10-16

## 2024-10-16 NOTE — PROGRESS NOTES
[facility] Clinical Note    Rachel Gomez is a 58 y.o. female, who is on the specialty pharmacy service for management of:  Migraine Core.    Rachel Gomez is taking: Emgality.      Rachel was contacted on 10/16/2024 at 11:25 AM for a virtual pharmacy visit with encounter number [id number] from:   [facility]  [facility] [facility] [address]  Dept: [phone]  Dept Fax: [phone]    Rachel was offered a Telemedicine Video visit or Telephone visit.  Rachel consented to a telephone visit, which was performed.    The most recent encounter visit with the referring prescriber María Thayer MD on 11/8/2023 was reviewed.  Pharmacy will continue to collaborate in the care of this patient with the referring prescriber María Thayer MD.    General Assessment      Impression/Plan  IMPRESSION/PLAN:  Is patient high risk (potential patients:  pregnancy, geriatric, pediatric)?  No  Is laboratory follow-up needed? No  Is a clinical intervention needed? No  Next reassessment date? ~4/15/2025  Additional comments:     Refer to the encounter summary report for documentation details about patient counseling and education.      Medication Adherence    The importance of adherence was discussed with the patient and they were advised to take the medication as prescribed by their provider. Patient was encouraged to call their physician's office if they have a question regarding a missed dose.     QOL/Patient Satisfaction  Rate your quality of life on scale of 1-10: 7  Rate your satisfaction with  Specialty Pharmacy on scale of 1-10: 5      Patient was advised to contact the pharmacy if there are any changes to their medication list, including prescriptions, OTC medications, herbal products, or supplements. Patient was advised of [facility]'s dispensing process, refill timeline, contact information ([phone]), and patient  management follow up. Patient confirmed understanding of education conducted during assessment. All patient questions and concerns were addressed to the best of my ability. Patient was encouraged to contact the specialty pharmacy with any questions or concerns.    Confirmed follow-up outreaches are properly scheduled and reviewed goals of therapy with the patient.        Esme Villatoro, PharmD

## 2024-10-27 DIAGNOSIS — N31.8 HYPERACTIVITY OF BLADDER: ICD-10-CM

## 2024-10-28 RX ORDER — MIRABEGRON 25 MG/1
25 TABLET, FILM COATED, EXTENDED RELEASE ORAL DAILY
Qty: 90 TABLET | Refills: 1 | Status: SHIPPED | OUTPATIENT
Start: 2024-10-28

## 2024-10-31 DIAGNOSIS — F41.8 ANXIETY WITH SOMATIC FEATURES: ICD-10-CM

## 2024-10-31 RX ORDER — HYDROXYZINE HYDROCHLORIDE 10 MG/1
10-20 TABLET, FILM COATED ORAL DAILY PRN
Qty: 20 TABLET | Refills: 0 | Status: SHIPPED | OUTPATIENT
Start: 2024-10-31 | End: 2024-11-10

## 2024-11-01 ENCOUNTER — OFFICE VISIT (OUTPATIENT)
Dept: ORTHOPEDIC SURGERY | Facility: CLINIC | Age: 59
End: 2024-11-01
Payer: COMMERCIAL

## 2024-11-01 VITALS — BODY MASS INDEX: 37.98 KG/M2 | HEIGHT: 67 IN | WEIGHT: 242 LBS

## 2024-11-01 DIAGNOSIS — M19.049 CMC ARTHRITIS: Primary | ICD-10-CM

## 2024-11-01 PROCEDURE — 20600 DRAIN/INJ JOINT/BURSA W/O US: CPT | Performed by: STUDENT IN AN ORGANIZED HEALTH CARE EDUCATION/TRAINING PROGRAM

## 2024-11-01 PROCEDURE — 99214 OFFICE O/P EST MOD 30 MIN: CPT | Performed by: STUDENT IN AN ORGANIZED HEALTH CARE EDUCATION/TRAINING PROGRAM

## 2024-11-01 PROCEDURE — 1036F TOBACCO NON-USER: CPT | Performed by: STUDENT IN AN ORGANIZED HEALTH CARE EDUCATION/TRAINING PROGRAM

## 2024-11-01 PROCEDURE — 3008F BODY MASS INDEX DOCD: CPT | Performed by: STUDENT IN AN ORGANIZED HEALTH CARE EDUCATION/TRAINING PROGRAM

## 2024-11-01 ASSESSMENT — PAIN DESCRIPTION - DESCRIPTORS
DESCRIPTORS: ACHING;SORE
DESCRIPTORS: ACHING;SORE

## 2024-11-01 ASSESSMENT — PAIN SCALES - GENERAL
PAINLEVEL_OUTOF10: 5 - MODERATE PAIN
PAINLEVEL_OUTOF10: 5 - MODERATE PAIN

## 2024-11-01 ASSESSMENT — PAIN - FUNCTIONAL ASSESSMENT
PAIN_FUNCTIONAL_ASSESSMENT: 0-10
PAIN_FUNCTIONAL_ASSESSMENT: 0-10

## 2024-11-05 ENCOUNTER — APPOINTMENT (OUTPATIENT)
Dept: ORTHOPEDIC SURGERY | Facility: HOSPITAL | Age: 59
End: 2024-11-05
Payer: COMMERCIAL

## 2024-11-15 ENCOUNTER — SPECIALTY PHARMACY (OUTPATIENT)
Dept: PHARMACY | Facility: CLINIC | Age: 59
End: 2024-11-15

## 2024-11-15 PROCEDURE — RXMED WILLOW AMBULATORY MEDICATION CHARGE

## 2024-11-16 ENCOUNTER — PHARMACY VISIT (OUTPATIENT)
Dept: PHARMACY | Facility: CLINIC | Age: 59
End: 2024-11-16
Payer: COMMERCIAL

## 2024-11-20 ENCOUNTER — APPOINTMENT (OUTPATIENT)
Dept: BEHAVIORAL HEALTH | Facility: CLINIC | Age: 59
End: 2024-11-20
Payer: COMMERCIAL

## 2024-11-20 DIAGNOSIS — F41.8 ANXIETY WITH SOMATIC FEATURES: ICD-10-CM

## 2024-11-20 DIAGNOSIS — G47.09 OTHER INSOMNIA: ICD-10-CM

## 2024-11-20 DIAGNOSIS — F43.21 GRIEF: ICD-10-CM

## 2024-11-20 DIAGNOSIS — F42.8 OTHER OBSESSIVE-COMPULSIVE DISORDERS: ICD-10-CM

## 2024-11-20 DIAGNOSIS — F33.1 MODERATE EPISODE OF RECURRENT MAJOR DEPRESSIVE DISORDER: ICD-10-CM

## 2024-11-20 PROCEDURE — 1036F TOBACCO NON-USER: CPT | Performed by: CLINICAL NURSE SPECIALIST

## 2024-11-20 PROCEDURE — 99214 OFFICE O/P EST MOD 30 MIN: CPT | Performed by: CLINICAL NURSE SPECIALIST

## 2024-11-20 RX ORDER — HYDROXYZINE HYDROCHLORIDE 25 MG/1
25-50 TABLET, FILM COATED ORAL DAILY PRN
Qty: 60 TABLET | Refills: 0 | Status: SHIPPED | OUTPATIENT
Start: 2024-11-20

## 2024-11-20 RX ORDER — ZOSTER VACCINE RECOMBINANT, ADJUVANTED 50 MCG/0.5
KIT INTRAMUSCULAR
COMMUNITY
Start: 2024-05-25

## 2024-11-20 NOTE — ASSESSMENT & PLAN NOTE
Encouraged to consider engagement in grief therapy/support group when she feels ready.   Will trial higher dose of hydroxyzine PRN, short-term, to manage more acute distress. Discussed indication(s), reviewed risks/benefits/alternatives.      Increase hydroxyzine to 25-50 mg PO daily PRN for anxiety.

## 2024-11-20 NOTE — PROGRESS NOTES
Outpatient Psychiatry      Subjective   Rachel Gomez, is a 58 y.o. female,  seen in follow-up.      Assessment/Plan   Problem List Items Addressed This Visit             ICD-10-CM    Anxiety with somatic features F41.8     Tolerating and benefiting from current regimen. Insomnia has largely improved- rarely taking doxepin PRN. Reviewed risk for weight gain from current medications. There have been no changes in dose this year of any of the psychotropic medications she is currently prescribed. Discussed that there is some risk for weight gain from sertraline, though this is still a fairly uncommon side effect, and timeline for emergence of weight changes is not typical given that she has been on the current dose for years. Encouraged to discuss weight changes with PCP and other specialists. Could always consider trialing an alternative to sertraline, or trialing off of the medication to evaluate further, but would need to carefully consider risks/benefits of doing so.     Continue doxepin 10 mg PO at bedtime PRN- no refill needed today.    Continue sertraline 200 mg PO daily- no refill needed today.             Relevant Medications    hydrOXYzine HCL (Atarax) 25 mg tablet    Other Relevant Orders    Follow Up In Psychiatry    Moderate episode of recurrent major depressive disorder F33.1     Tolerating and benefiting from current regimen. No indication for medication changes today.    Continue lamotrigine 200 mg PO daily- no refill needed today.    Continue sertraline as noted above.    Advised of potentially fatal rash (i.e. Saucedo-Julio Cesar Syndrome) in 1:10,000 individuals. Agrees to stop medication and seek medical attention immediately if rash or blistering of the mucosal surfaces develops; also discussed risk for benign drug-induced rash, and advised to cease medication and seek immediate medical attention to r/o SJS given its potential lethality. Further advised that the risk for this reaction increases if  proper dose titration is not followed, and that after 2 days of non-adherence, re-titration is necessary.          Relevant Orders    Follow Up In Psychiatry    Obsessive-compulsive disorder F42.9     Plan as noted above.          Relevant Medications    hydrOXYzine HCL (Atarax) 25 mg tablet    Other Relevant Orders    Follow Up In Psychiatry    Other insomnia G47.09    Relevant Orders    Follow Up In Psychiatry    Grief F43.21     Encouraged to consider engagement in grief therapy/support group when she feels ready.   Will trial higher dose of hydroxyzine PRN, short-term, to manage more acute distress. Discussed indication(s), reviewed risks/benefits/alternatives.      Increase hydroxyzine to 25-50 mg PO daily PRN for anxiety.             Follow-up in 4-6 weeks.     Risk Assessment:  Risk Assessment: Rachel Gomez is currently a low acute risk of suicide and self-harm due to no past suicide attempt(s) and not currently endorsing thoughts of suicide. Rachle Gomez is currently a low acute risk of violence and harm to others due to no past history of violence and not currently threatening others.  Suicidal Risk Factors:  and current psychiatric illness  Violence Risk Factors: none  Protective Factors: strong coping skills, sense of responsibility towards family, social support/connectedness, moral objections to suicide, positive family relationships, hopefulness/future orientation, and marriage/partnership      Reason for Visit:  Follow-up for medication management.     HPI:  Since her last visit,     Grieving recent death of friend. Was being treated for metastatic CA, and placed in hospice care.   Was her best friend of 43 years, still grieving, crying all the time.   Has stayed in touch with friend's siblings, which has helped.   Hospice offered grief support services. Not ready to consider that yet.   Trialed hydroxyzine PRN for anxiety, but limited benefit with low dose.   Still struggling a lot  "with anxiety and distress over friend's death. Knows grieving is healthy. Just feels like it's very difficult to contain intense emotions and get through the day right now.     Went to [place] with her oldest daughter- enjoyed it to an extent, but very difficult given friend's death just a few days prior.     Youngest daughter abruptly quit her job  Son was working a full time job, but got laid off a few months ago. Still hasn't gotten another job.   Not sure how either of them are paying their bills.  Knows they have to figure things out and that she can't control their decisions, but hard to see them making poor choices. Hard not to worry about them.     Has no desire right now to do anything social.   Trying to push herself to do things for the holidays.   Usually loves this time of year, \"but just not feeling it right now.\"   In a good sleep routine- getting quality sleep at night, and energy level throughout the day has been good.     No concerns with routine medications otherwise today.     Denies suicidal ideation or a passive death wish.     No new medications or health problems. Still struggling with weight gain/difficulty losing weight. Sees endocrinologist early next year.      Current Psychiatric Medications:  Lamotrigine 200 mg PO daily  Sertraline 200 mg PO daily  Doxepin 10 mg PO at bedtime PRN -rarely taking.  Hydroxyzine 10-20 mg PO daily PRN for anxiety - limited benefit.       Record Review: brief     Medical Review Of Systems:  Pertinent items are noted in HPI.    Psychiatric Review Of Systems:  As noted in HPI.        Objective   Mental Status Exam  General Appearance: Well groomed, appropriate eye contact  Attitude/Behavior: Cooperative  Motor: No psychomotor agitation or retardation, no tremor or other abnormal movements  Speech: Normal rate, volume, prosody  Mood: \"Not too good.\"  Affect: Sad/Tearful, Dysphoric, constricted, Congruent with mood and topic of conversation  Thought Process: " Linear, goal directed  Thought Associations: No loosening of associations  Thought Content: Other: (comment) (grieving recent death of very close friend. Anxious themes r/t adult children, upcoming holidays.)  Perception: No perceptual abnormalities noted  Sensorium: Alert and oriented to person, place, time and situation  Insight: Intact  Judgement: Intact  Cognition: Cognitively intact to conversational testing with respect to attention, orientation, fund of knowledge, recent and remote memory, and language    Vitals:  There were no vitals filed for this visit.    Labs:  not applicable        Dominique Hoang, APRN-CNP, APRN-CNS

## 2024-12-04 ENCOUNTER — APPOINTMENT (OUTPATIENT)
Dept: NEUROLOGY | Facility: CLINIC | Age: 59
End: 2024-12-04
Payer: COMMERCIAL

## 2024-12-04 VITALS
DIASTOLIC BLOOD PRESSURE: 90 MMHG | SYSTOLIC BLOOD PRESSURE: 158 MMHG | HEART RATE: 74 BPM | RESPIRATION RATE: 12 BRPM | HEIGHT: 67 IN | TEMPERATURE: 97 F | WEIGHT: 243 LBS | BODY MASS INDEX: 38.14 KG/M2

## 2024-12-04 DIAGNOSIS — R25.1 TREMOR: ICD-10-CM

## 2024-12-04 DIAGNOSIS — G43.719 INTRACTABLE CHRONIC MIGRAINE WITHOUT AURA AND WITHOUT STATUS MIGRAINOSUS: Primary | ICD-10-CM

## 2024-12-04 PROCEDURE — 3077F SYST BP >= 140 MM HG: CPT | Performed by: PSYCHIATRY & NEUROLOGY

## 2024-12-04 PROCEDURE — G2211 COMPLEX E/M VISIT ADD ON: HCPCS | Performed by: PSYCHIATRY & NEUROLOGY

## 2024-12-04 PROCEDURE — 99214 OFFICE O/P EST MOD 30 MIN: CPT | Performed by: PSYCHIATRY & NEUROLOGY

## 2024-12-04 PROCEDURE — 3080F DIAST BP >= 90 MM HG: CPT | Performed by: PSYCHIATRY & NEUROLOGY

## 2024-12-04 PROCEDURE — 3008F BODY MASS INDEX DOCD: CPT | Performed by: PSYCHIATRY & NEUROLOGY

## 2024-12-04 PROCEDURE — 1036F TOBACCO NON-USER: CPT | Performed by: PSYCHIATRY & NEUROLOGY

## 2024-12-04 RX ORDER — ZOLMITRIPTAN 5 MG/1
SPRAY NASAL
Qty: 6 ML | Refills: 3 | Status: SHIPPED | OUTPATIENT
Start: 2024-12-04

## 2024-12-04 ASSESSMENT — ENCOUNTER SYMPTOMS
OCCASIONAL FEELINGS OF UNSTEADINESS: 0
LOSS OF SENSATION IN FEET: 0
DEPRESSION: 0

## 2024-12-04 ASSESSMENT — PAIN SCALES - GENERAL: PAINLEVEL_OUTOF10: 0-NO PAIN

## 2024-12-04 NOTE — PATIENT INSTRUCTIONS
"It was a pleasure seeing you today.     There are good alternative medication and over-the-counter strategies to help migraines:     You can try riboflavin (vitamin B2) 200 mg twice a day as a natural migraine preventive. This may take several weeks to become fully effective.  You can try magnesium 400 mg once daily as a natural migraine preventive. If it causes loose bowel movements, reduce the dose to 200 mg.    - Melatonin 3-5 mg take 2 hours before bedtime can be helpful for difficulty with sleeping and for headaches, especially cluster headaches but also migraines.  - Avoid triggers that can cause or worsen migraines (food, lack of sleep, stress)  - Keep a diary of your headaches to note how often you get them, how long they last and any other helpful information  - Avoid taking medication for treatment of headaches (NOT preventative medication) more than 3 days per week. This includes both prescription medication and over the counter medication.  - Take your preventative medication as directed. Let me know if you have side effects or problems with the medication. Do not suddenly stop taking the medication.     Please make a follow up appointment in 5-6 months.  You may also schedule a phone or virtual visit sooner on a Friday morning with me as needed before the next clinic appointment.     For any urgent issues or needing to speak to a medical assistant please call [phone], option 6 during our office hours Monday-Friday 8am-4pm, and leave a voicemail with your concern.  My office will try to reach back you as soon as possible within 24 (business) hours.  If you have an emergency please call 911 or visit a local urgent care or nearest emergency room.      Please understand that [email or web address] is a useful communication tool for simple \"normal\" results or a refill request but I would not recommend using this tool for emergent or urgent issues or for conversations with me.  I am happy to ask my staff to rearrange a " follow up visit or a virtual visit sooner than requested if appropriate for your care.

## 2024-12-04 NOTE — PROGRESS NOTES
Progress note:    HPI:    57 y/o F here for follow up for migraine headaches and tremor.  Patient was last seen November 2023.    Her headaches typically worsen with weather changes and certain dietary changes.   Spring and Fall are worse.      Botox typically in the past lasts 2 1/2 months, only had a couple headaches during that time, the past 2 weeks has had more HAs, stronger. she is happy with the response.      used to be followed by Sarahy Morrison (NP) and Dr. Castro at [facility]     hx prolactinoma s/p TSS 10 years ago at [facility], sees endocrine.     Onset of headaches: 40+ years  Frequency of headaches (days per month): without botox - usually 20 days/ month  Duration of headaches (average): All day long.  Severity of headaches (out of 10): 5-7/10  Aura: none , wakes up with HA  Nausea/vomiting: Yes - nausea  Photophobia: Yes  Phonophobia: Yes     Location: Across forehead to temple area.     triggers:Weather changes, foods, not eating correctly     brain imaging: MRI performed regularly ([facility]) due to hx of pituitary tumor/removed surgically 2010 at [facility]. - her endocrinologist just ordered MRI sella.      Preventative medications:   Topiramate  Zonisamide  Buproprion  ubrelvy  emgality       Botox (4 rounds)- in 2018 with 200 units, last one being 12/4/2018- stopped due to insurance.        Abortive medications: Dexamethasone 4 mg (during a cycle)    Zomig nasal spray.- this is only one that worked for her. But not covered with insurance.    Rizatriptan.   Ubrelvy      Past Medical History:   Diagnosis Date    Bipolar II disorder (Multi) 02/01/2017    Bipolar 2 disorder    Encounter for general adult medical examination without abnormal findings 10/19/2014    Preventative health care    Major depressive disorder, recurrent, in partial remission (CMS-[facility]) 02/17/2021    Major depressive disorder, recurrent episode, in partial remission with anxious distress    Major depressive disorder, recurrent, mild (CMS-HCC)  02/01/2017    Major depressive disorder, recurrent episode, mild with anxious distress    Personal history of other mental and behavioral disorders 02/03/2017    History of depression    Personal history of other mental and behavioral disorders 02/01/2017    History of bipolar disorder    Personal history of other specified conditions     History of headache   Constitutional: General appearance: no acute distress. Pleasant.   Auscultation of Heart: Regular rate and rhythm, no murmurs, normal S1 and S2.   Carotid Arteries: Intact without any bruits   Peripheral Vascular Exam: Pulses +2 and equal in all extremities. No swelling, edema or tenderness to palpations   Mental status: The patient was in no distress, alert, interactive and cooperative. Affect is appropriate.   Orientation: oriented to person, oriented to place and oriented to time.   Memory: recent memory intact and remote memory intact.   Attention: normal attention span and normal concentrating ability.   Language: normal comprehension and no difficulty naming common objects.   Fund of knowledge: Patient displays adequate knowledge of current events, adequate fund of knowledge and fund of knowledge .   Eyes: The ophthalmoscopic examination was normal. The fundi are visualized with normal disc margins and without hemorrhages   Cranial nerve II: Visual fields full to confrontation.   Cranial nerves III, IV, and VI: Pupils round, equally reactive to light; no ptosis. EOMs intact. No nystagmus.   Cranial Nerve V: Facial sensation intact bilaterally.   Cranial nerve VII: Normal and symmetric facial strength.   Cranial nerve VIII: Hearing is intact bilaterally to finger rub / whisper.   Cranial nerves IX and X: Palate elevates symmetrically.   Cranial nerve XI: Shoulder shrug and neck rotation strength are intact.   Cranial nerve XII: Tongue midline with normal strength.   Motor:  Muscle bulk was normal in both upper and lower extremities.    No atrophy.    Strength is normal   Deep Tendon Reflexes: left biceps 2+ , right biceps 2+, left triceps 2+, right triceps 2+, left brachioradialis 2+, right brachioradialis 2+, left patella 2+, right patella 2+, left ankle jerk 2+, right ankle jerk 2+   Plantar Reflex: Toes downgoing to plantar stimulation on the left. Toes downgoing to plantar stimulation on the right.   Sensory Exam: Normal to light touch.   Coordination: limb dystaxia   Gait:  cautious.        Assessment      Migraine - still pretty frequent HA, more than 1/2 the month of headaches.  Continue emgality.  Zomig nasal spray- has been only the medication abortive that has helped her.   Tremor - observe.  Not bothering her.

## 2024-12-11 DIAGNOSIS — D35.2 PITUITARY ADENOMA (MULTI): Primary | ICD-10-CM

## 2024-12-11 DIAGNOSIS — R63.5 WEIGHT GAIN: ICD-10-CM

## 2024-12-17 DIAGNOSIS — M19.90 UNSPECIFIED OSTEOARTHRITIS, UNSPECIFIED SITE: ICD-10-CM

## 2024-12-17 RX ORDER — MELOXICAM 15 MG/1
15 TABLET ORAL DAILY PRN
Qty: 90 TABLET | Refills: 1 | Status: SHIPPED | OUTPATIENT
Start: 2024-12-17

## 2024-12-18 ENCOUNTER — APPOINTMENT (OUTPATIENT)
Dept: BEHAVIORAL HEALTH | Facility: CLINIC | Age: 59
End: 2024-12-18
Payer: COMMERCIAL

## 2024-12-18 DIAGNOSIS — F41.8 ANXIETY WITH SOMATIC FEATURES: ICD-10-CM

## 2024-12-18 DIAGNOSIS — F43.23 ADJUSTMENT DISORDER WITH MIXED ANXIETY AND DEPRESSED MOOD: ICD-10-CM

## 2024-12-18 DIAGNOSIS — F42.8 OTHER OBSESSIVE-COMPULSIVE DISORDERS: ICD-10-CM

## 2024-12-18 DIAGNOSIS — F33.1 MODERATE EPISODE OF RECURRENT MAJOR DEPRESSIVE DISORDER: ICD-10-CM

## 2024-12-18 DIAGNOSIS — G47.09 OTHER INSOMNIA: ICD-10-CM

## 2024-12-18 PROCEDURE — 1036F TOBACCO NON-USER: CPT | Performed by: CLINICAL NURSE SPECIALIST

## 2024-12-18 PROCEDURE — 99215 OFFICE O/P EST HI 40 MIN: CPT | Performed by: CLINICAL NURSE SPECIALIST

## 2024-12-18 RX ORDER — LORAZEPAM 0.5 MG/1
0.5 TABLET ORAL DAILY PRN
Qty: 10 TABLET | Refills: 1 | Status: SHIPPED | OUTPATIENT
Start: 2024-12-18

## 2024-12-18 RX ORDER — BUSPIRONE HYDROCHLORIDE 5 MG/1
5 TABLET ORAL 2 TIMES DAILY
Qty: 60 TABLET | Refills: 1 | Status: SHIPPED | OUTPATIENT
Start: 2024-12-18 | End: 2025-02-16

## 2024-12-18 NOTE — ASSESSMENT & PLAN NOTE
Continue sertraline as noted above.  Continue lamotrigine 200 mg PO daily- no refill needed today.      Advised of potentially fatal rash (i.e. Saucedo-Julio Cesar Syndrome) in 1:10,000 individuals. Agrees to stop medication and seek medical attention immediately if rash or blistering of the mucosal surfaces develops; also discussed risk for benign drug-induced rash, and advised to cease medication and seek immediate medical attention to r/o SJS given its potential lethality. Further advised that the risk for this reaction increases if proper dose titration is not followed, and that after 2 days of non-adherence, re-titration is necessary.

## 2024-12-18 NOTE — PROGRESS NOTES
Outpatient Psychiatry      Subjective   Rachel Gomez, is a 58 y.o. female,  seen in follow-up.      Assessment/Plan   Problem List Items Addressed This Visit             ICD-10-CM    Anxiety with somatic features F41.8     Exacerbation of anxious sx in the context of psychosocial stressors- primarily r/t concerns about youngest daughter, grief over recent death of best friend- limited benefit from hydroxyzine PRN, despite dose titration. Prescribed beta blocked for tremor. Discussed augmenting sertraline with buspirone, and in the interim will start low-dose lorazepam PRN, short term, while routine medications are being adjusted. Discussed indication(s), reviewed risks/benefits/alternatives.      Start buspirone 5 mg PO at bedtime x 1 week, then increase to 5 mg PO BID.   Start lorazepam 0.5 mg PO daily PRN (#10 tabs with 1 refill).   Continue sertraline 200 mg PO daily- no refill needed today.    Not currently interested in individual therapy. Aware that I can assist with referrals.     No longer taking doxepin PRN for sleep.     Reviewed OARRS, no discrepancies or concerns. Discussed risk of motor/cognitive impairment, sedation, dependence, tolerance, abuse, withdrawal sequelae, accidental overdose, life-threatening respiratory depression (leroy. in combination with alcohol and/or opioids), excess sedation in combination with other sedating medicines.           Relevant Medications    busPIRone (Buspar) 5 mg tablet    LORazepam (Ativan) 0.5 mg tablet    Other Relevant Orders    Follow Up In Psychiatry    Moderate episode of recurrent major depressive disorder F33.1     Continue sertraline as noted above.  Continue lamotrigine 200 mg PO daily- no refill needed today.      Advised of potentially fatal rash (i.e. Saucedo-Julio Cesar Syndrome) in 1:10,000 individuals. Agrees to stop medication and seek medical attention immediately if rash or blistering of the mucosal surfaces develops; also discussed risk for benign  "drug-induced rash, and advised to cease medication and seek immediate medical attention to r/o SJS given its potential lethality. Further advised that the risk for this reaction increases if proper dose titration is not followed, and that after 2 days of non-adherence, re-titration is necessary.          Relevant Orders    Follow Up In Psychiatry    Obsessive-compulsive disorder F42.9     Plan as noted above.          Relevant Medications    LORazepam (Ativan) 0.5 mg tablet    Other Relevant Orders    Follow Up In Psychiatry    Other insomnia G47.09     Resolved- no longer taking doxepin PRN for sleep.          Relevant Orders    Follow Up In Psychiatry    Adjustment disorder with mixed anxiety and depressed mood F43.23     R/t psychosocial stressors including concern over youngest daughter's behavior (heavy alcohol use, financial instability, unemployment, withdrawn from family) as well as best friend's death last month.   Plan as noted above.             Follow-up in 4-6 weeks.     Risk Assessment:  Risk Assessment: Rachel Gomez is currently a low acute risk of suicide and self-harm due to no past suicide attempt(s) and not currently endorsing thoughts of suicide. Rachel Gomez is currently a low acute risk of violence and harm to others due to no past history of violence and not currently threatening others.  Suicidal Risk Factors:  and current psychiatric illness  Violence Risk Factors: none  Protective Factors: strong coping skills, sense of responsibility towards family, social support/connectedness, moral objections to suicide, positive family relationships, hopefulness/future orientation, and marriage/partnership      Reason for Visit:  Follow-up for medication management.     HPI:  Since her last visit,     Things are, \"not really much better.\"   Things have gotten pretty bad for her younger daughter- in very deep debt, not working, spending excessively. Online gambling, drinking a lot. " "  Thanksgiving was very uncomfortable. The other 2 kids are frustrated with their sister. Had to take the alcohol away because she was getting intoxicated. Becomes a different person when she drinks.   Worries about family hx (both of her parents developed cirrhosis from alcohol abuse). Daughter denies any problem with drinking, denies that she's in debt or having any financial difficulty, defensive when they (pt or her ) try to discuss it with her.     Wishes that [place] was just over.  Doesn't want to go anywhere or celebrate anything.  Older daughter planned events for her birthday, but it's hard to even look forward to it.     Tried higher dose of hydroxyzine PRN for anxiety, but limited benefit.   Still experiencing near panic sx several days/week.   Mind races with worries about everything that is going on.   Still grieving friend's death as well.   Has noticed sleeping more than usual (~10 hrs/night). No longer taking doxepin PRN, \"I definitely don't need any help [with sleep].\"    Denies suicidal ideation or a passive death wish.     No new medications or health problems.       Current Psychiatric Medications:  Lamotrigine 200 mg PO daily  Sertraline 200 mg PO daily          Record Review: brief     Medical Review Of Systems:  Pertinent items are noted in HPI.    Psychiatric Review Of Systems:  As noted in HPI.        Objective   Mental Status Exam  General Appearance: Well groomed, appropriate eye contact  Attitude/Behavior: Cooperative  Motor: Tremors apparent  Speech: Normal rate, volume, prosody  Mood: \"Not that great.\"  Affect: Dysphoric, constricted but reactive, Congruent with mood and topic of conversation, Anxious  Thought Process: Perseverative  Thought Associations: No loosening of associations  Thought Content: Other: (comment) (anxious themes r/t concern about youngest daughter, impact on family dynamics, upcoming holidays. Continuing to grieve death of best friend last " month.)  Perception: No perceptual abnormalities noted  Sensorium: Alert and oriented to person, place, time and situation  Insight: Intact  Judgement: Intact  Cognition: Cognitively intact to conversational testing with respect to attention, orientation, fund of knowledge, recent and remote memory, and language    Vitals:  There were no vitals filed for this visit.    Labs:  not applicable        Dominique Hoang, APRN-CNP, APRN-CNS

## 2024-12-18 NOTE — ASSESSMENT & PLAN NOTE
R/t psychosocial stressors including concern over youngest daughter's behavior (heavy alcohol use, financial instability, unemployment, withdrawn from family) as well as best friend's death last month.   Plan as noted above.

## 2024-12-18 NOTE — ASSESSMENT & PLAN NOTE
Exacerbation of anxious sx in the context of psychosocial stressors- primarily r/t concerns about youngest daughter, grief over recent death of best friend- limited benefit from hydroxyzine PRN, despite dose titration. Prescribed beta blocked for tremor. Discussed augmenting sertraline with buspirone, and in the interim will start low-dose lorazepam PRN, short term, while routine medications are being adjusted. Discussed indication(s), reviewed risks/benefits/alternatives.      Start buspirone 5 mg PO at bedtime x 1 week, then increase to 5 mg PO BID.   Start lorazepam 0.5 mg PO daily PRN (#10 tabs with 1 refill).   Continue sertraline 200 mg PO daily- no refill needed today.    Not currently interested in individual therapy. Aware that I can assist with referrals.     No longer taking doxepin PRN for sleep.     Reviewed OARRS, no discrepancies or concerns. Discussed risk of motor/cognitive impairment, sedation, dependence, tolerance, abuse, withdrawal sequelae, accidental overdose, life-threatening respiratory depression (leroy. in combination with alcohol and/or opioids), excess sedation in combination with other sedating medicines.

## 2024-12-23 ENCOUNTER — APPOINTMENT (OUTPATIENT)
Dept: BEHAVIORAL HEALTH | Facility: CLINIC | Age: 59
End: 2024-12-23
Payer: COMMERCIAL

## 2025-01-10 ENCOUNTER — LAB (OUTPATIENT)
Dept: LAB | Facility: LAB | Age: 60
End: 2025-01-10
Payer: COMMERCIAL

## 2025-01-10 DIAGNOSIS — D35.2 PITUITARY ADENOMA (MULTI): ICD-10-CM

## 2025-01-10 DIAGNOSIS — E55.9 VITAMIN D DEFICIENCY: ICD-10-CM

## 2025-01-10 DIAGNOSIS — R63.5 WEIGHT GAIN: ICD-10-CM

## 2025-01-10 LAB
25(OH)D3 SERPL-MCNC: 37 NG/ML (ref 30–100)
CORTIS AM PEAK SERPL-MSCNC: 24 UG/DL (ref 5–20)
GLUCOSE P FAST SERPL-MCNC: 91 MG/DL (ref 74–99)
INSULIN P FAST SERPL-ACNC: 11 UIU/ML (ref 3–25)
PROLACTIN SERPL-MCNC: 9 UG/L (ref 3–20)
T4 FREE SERPL-MCNC: 1.2 NG/DL (ref 0.61–1.12)
TSH SERPL-ACNC: 0.13 MIU/L (ref 0.44–3.98)

## 2025-01-10 PROCEDURE — 84146 ASSAY OF PROLACTIN: CPT

## 2025-01-10 PROCEDURE — 82533 TOTAL CORTISOL: CPT

## 2025-01-10 PROCEDURE — 82306 VITAMIN D 25 HYDROXY: CPT

## 2025-01-10 PROCEDURE — 82024 ASSAY OF ACTH: CPT

## 2025-01-10 PROCEDURE — 36415 COLL VENOUS BLD VENIPUNCTURE: CPT

## 2025-01-10 PROCEDURE — 84443 ASSAY THYROID STIM HORMONE: CPT

## 2025-01-10 PROCEDURE — 84439 ASSAY OF FREE THYROXINE: CPT

## 2025-01-10 PROCEDURE — 83525 ASSAY OF INSULIN: CPT

## 2025-01-10 PROCEDURE — 82947 ASSAY GLUCOSE BLOOD QUANT: CPT

## 2025-01-12 LAB — ACTH PLAS-MCNC: 45.9 PG/ML (ref 7.2–63.3)

## 2025-01-15 ENCOUNTER — APPOINTMENT (OUTPATIENT)
Dept: ENDOCRINOLOGY | Facility: CLINIC | Age: 60
End: 2025-01-15
Payer: COMMERCIAL

## 2025-01-15 VITALS
DIASTOLIC BLOOD PRESSURE: 76 MMHG | WEIGHT: 247 LBS | SYSTOLIC BLOOD PRESSURE: 124 MMHG | BODY MASS INDEX: 38.77 KG/M2 | HEIGHT: 67 IN

## 2025-01-15 DIAGNOSIS — E88.819 INSULIN RESISTANCE: ICD-10-CM

## 2025-01-15 DIAGNOSIS — D35.2 PITUITARY ADENOMA (MULTI): ICD-10-CM

## 2025-01-15 DIAGNOSIS — E03.9 HYPOTHYROIDISM, UNSPECIFIED TYPE: Primary | ICD-10-CM

## 2025-01-15 PROCEDURE — 3078F DIAST BP <80 MM HG: CPT | Performed by: STUDENT IN AN ORGANIZED HEALTH CARE EDUCATION/TRAINING PROGRAM

## 2025-01-15 PROCEDURE — 3008F BODY MASS INDEX DOCD: CPT | Performed by: STUDENT IN AN ORGANIZED HEALTH CARE EDUCATION/TRAINING PROGRAM

## 2025-01-15 PROCEDURE — 99214 OFFICE O/P EST MOD 30 MIN: CPT | Performed by: STUDENT IN AN ORGANIZED HEALTH CARE EDUCATION/TRAINING PROGRAM

## 2025-01-15 PROCEDURE — 3074F SYST BP LT 130 MM HG: CPT | Performed by: STUDENT IN AN ORGANIZED HEALTH CARE EDUCATION/TRAINING PROGRAM

## 2025-01-15 RX ORDER — LEVOTHYROXINE SODIUM 137 UG/1
137 TABLET ORAL DAILY
Qty: 90 TABLET | Refills: 3 | Status: SHIPPED | OUTPATIENT
Start: 2025-01-15

## 2025-01-15 RX ORDER — METFORMIN HYDROCHLORIDE 500 MG/1
500 TABLET ORAL
Qty: 90 TABLET | Refills: 3 | Status: SHIPPED | OUTPATIENT
Start: 2025-01-15 | End: 2026-01-15

## 2025-01-15 NOTE — PROGRESS NOTES
"Subjective   Patient ID: Rachel Gomez is a 59 y.o. female who presents for Hypothyroidism ((Rachel Gomez is a 58 y.o. female who presents for Hypothyroidism (PCP:Gross  /Synthroid SAMANTHA through synthroid direct pharmacy /Recent labs done 1/10/25) no missed doses and taking correctly).   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 She present today for regular follow up.  She is concerned about gaining 15 lbs in 1 year.      Pituitary adenoma history :  She followed with Endocrinology at [facility] , she previously followed with Dr. Rausch and Dr. cannon  Diagnosed with hypothyroidism after her TSS  She was started on LT4 replacement after her TSS      She had a hysterectomy 10 year ago for uterine prolapse , Ovaries were not removed.    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       -Pt with secondary hypothyroidism on replacement , biochemically and clinically euthyroid on Synthroid 137 mcg through  delivery on Lt4  6.5 pills /week        -No symptoms of AI or DI , no other hormonal deficiencies detected   -Migraine headaches , follows with neurology , on Botox and is on Emglaity.  -chronic GERD on PPI for ~ 10 years   - vitamin D deficiency finished vitamin D 50,000 IU weekly , now takes  D3 10,000 international  once a week   - BMI 38  weight gain of 15 lbs , mildly elevated cortisol level ACTH wnl . Attributes this to stress of losing her best friend. 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 We discussed " high protein low carb diet at length will also start metfrmin 500 mg once daily      Follow up with new endocrinologist in 6 months

## 2025-01-30 ENCOUNTER — APPOINTMENT (OUTPATIENT)
Dept: BEHAVIORAL HEALTH | Facility: CLINIC | Age: 60
End: 2025-01-30
Payer: COMMERCIAL

## 2025-01-30 DIAGNOSIS — F33.1 MODERATE EPISODE OF RECURRENT MAJOR DEPRESSIVE DISORDER: ICD-10-CM

## 2025-01-30 DIAGNOSIS — G47.09 OTHER INSOMNIA: ICD-10-CM

## 2025-01-30 DIAGNOSIS — F42.8 OTHER OBSESSIVE-COMPULSIVE DISORDERS: ICD-10-CM

## 2025-01-30 DIAGNOSIS — F41.8 ANXIETY WITH SOMATIC FEATURES: ICD-10-CM

## 2025-01-30 PROCEDURE — 1036F TOBACCO NON-USER: CPT | Performed by: CLINICAL NURSE SPECIALIST

## 2025-01-30 PROCEDURE — 99214 OFFICE O/P EST MOD 30 MIN: CPT | Performed by: CLINICAL NURSE SPECIALIST

## 2025-01-30 RX ORDER — LAMOTRIGINE 200 MG/1
200 TABLET ORAL DAILY
Qty: 90 TABLET | Refills: 3 | Status: SHIPPED | OUTPATIENT
Start: 2025-01-30 | End: 2026-01-25

## 2025-01-30 RX ORDER — SERTRALINE HYDROCHLORIDE 100 MG/1
200 TABLET, FILM COATED ORAL DAILY
Qty: 180 TABLET | Refills: 3 | Status: SHIPPED | OUTPATIENT
Start: 2025-01-30 | End: 2026-01-25

## 2025-01-30 RX ORDER — BUSPIRONE HYDROCHLORIDE 5 MG/1
5 TABLET ORAL 2 TIMES DAILY
Qty: 60 TABLET | Refills: 1 | Status: SHIPPED | OUTPATIENT
Start: 2025-01-30 | End: 2025-03-31

## 2025-01-30 NOTE — ASSESSMENT & PLAN NOTE
Continue sertraline as noted above.  Continue lamotrigine 200 mg PO daily- refill sent to pharmacy today.     Advised of potentially fatal rash (i.e. Saucedo-Julio Cesar Syndrome) in 1:10,000 individuals. Agrees to stop medication and seek medical attention immediately if rash or blistering of the mucosal surfaces develops; also discussed risk for benign drug-induced rash, and advised to cease medication and seek immediate medical attention to r/o SJS given its potential lethality. Further advised that the risk for this reaction increases if proper dose titration is not followed, and that after 2 days of non-adherence, re-titration is necessary.

## 2025-01-30 NOTE — ASSESSMENT & PLAN NOTE
Tolerated starting buspirone with perhaps some emerging benefit for anxious sx. Trialed lorazepam PRN a few times, unsure how helpful it was, but has not recently had severe sx warranting PRN medication. Will continue current regimen.     Continue buspirone 5 mg PO BID- refill sent to pharmacy today.   Continue lorazepam 0.5 mg PO daily PRN (#10 tabs with 1 refill)- last fill 12/18/2024, no refill needed today.   Continue sertraline 200 mg PO daily- refill sent to pharmacy today.   Not currently interested in individual therapy. Aware that I can assist with referrals.     Reviewed OARRS, no discrepancies or concerns. Discussed risk of motor/cognitive impairment, sedation, dependence, tolerance, abuse, withdrawal sequelae, accidental overdose, life-threatening respiratory depression (leroy. in combination with alcohol and/or opioids), excess sedation in combination with other sedating medicines.

## 2025-01-30 NOTE — PROGRESS NOTES
Outpatient Psychiatry      Subjective   Rachel Gomez, is a 59 y.o. female,  seen in follow-up.      Assessment/Plan   Problem List Items Addressed This Visit             ICD-10-CM    Anxiety with somatic features F41.8     Tolerated starting buspirone with perhaps some emerging benefit for anxious sx. Trialed lorazepam PRN a few times, unsure how helpful it was, but has not recently had severe sx warranting PRN medication. Will continue current regimen.     Continue buspirone 5 mg PO BID- refill sent to pharmacy today.   Continue lorazepam 0.5 mg PO daily PRN (#10 tabs with 1 refill)- last fill 12/18/2024, no refill needed today.   Continue sertraline 200 mg PO daily- refill sent to pharmacy today.   Not currently interested in individual therapy. Aware that I can assist with referrals.     Reviewed OARRS, no discrepancies or concerns. Discussed risk of motor/cognitive impairment, sedation, dependence, tolerance, abuse, withdrawal sequelae, accidental overdose, life-threatening respiratory depression (leroy. in combination with alcohol and/or opioids), excess sedation in combination with other sedating medicines.           Relevant Medications    busPIRone (Buspar) 5 mg tablet    sertraline (Zoloft) 100 mg tablet    Other Relevant Orders    Follow Up In Psychiatry    Moderate episode of recurrent major depressive disorder F33.1     Continue sertraline as noted above.  Continue lamotrigine 200 mg PO daily- refill sent to pharmacy today.     Advised of potentially fatal rash (i.e. Saucedo-Julio Cesar Syndrome) in 1:10,000 individuals. Agrees to stop medication and seek medical attention immediately if rash or blistering of the mucosal surfaces develops; also discussed risk for benign drug-induced rash, and advised to cease medication and seek immediate medical attention to r/o SJS given its potential lethality. Further advised that the risk for this reaction increases if proper dose titration is not followed, and that  "after 2 days of non-adherence, re-titration is necessary.          Relevant Medications    lamoTRIgine (LaMICtal) 200 mg tablet    sertraline (Zoloft) 100 mg tablet    Other Relevant Orders    Follow Up In Psychiatry    Obsessive-compulsive disorder F42.9     Plan as noted above.          Relevant Medications    sertraline (Zoloft) 100 mg tablet    Other Relevant Orders    Follow Up In Psychiatry    Other insomnia G47.09     Resolved- no longer taking doxepin PRN for sleep.          Relevant Orders    Follow Up In Psychiatry       Follow-up in 4-6 weeks.     Risk Assessment:  Risk Assessment: Rachel Gomez is currently a low acute risk of suicide and self-harm due to no past suicide attempt(s) and not currently endorsing thoughts of suicide. Rachel Gomez is currently a low acute risk of violence and harm to others due to no past history of violence and not currently threatening others.  Suicidal Risk Factors:  and current psychiatric illness  Violence Risk Factors: none  Protective Factors: strong coping skills, sense of responsibility towards family, social support/connectedness, moral objections to suicide, positive family relationships, hopefulness/future orientation, and marriage/partnership      Reason for Visit:  Follow-up for medication management.     HPI:  Since her last visit,     Feeling much better.  Wanda was ok- it was quiet.   After the New Year, mood picked up.   Contacted endocrinologist re: weight gain.   Diagnosed with insulin resistance, and found cortisol levels were very high.   Has started metformin, and made diet changes.   Already has lost ~5 lbs.     Next month having injections in her hands- sx have definitely been worse lately. Not sure if r/t cold weather.     Took lorazepam PRN a few times with a slight benefit. \"It wasn't drastic, I guess it helped a little.\"   Tolerated starting buspirone without side effects.  Hard to say whether the medication change has " "impacted improvement in mood/anxiety, vs improvement in how she's feeling physically having a significant impact.   Sleeping a little bit more.   Not sure if it's the weather.  Other than going out to do shopping, or occasionally visiting with friends, doesn't do as much in the winter.   Energy level has been good.     A little worried- son is planning to move back in with them in March or April.   Oldest daughter just broke up with bf of 6 years, \"so she's not going anywhere anytime soon.\"  Got used to quiet at home with just her and her .     Denies suicidal ideation or a passive death wish.     No new medications or health problems.       Current Psychiatric Medications:  Lamotrigine 200 mg PO daily  Sertraline 200 mg PO daily  Buspirone 5 mg PO BID   Lorazepam 0.5 mg PO PRN for anxiety- has taken a few times (~5 tabs)    Record Review: brief     Medical Review Of Systems:  Pertinent items are noted in HPI.    Psychiatric Review Of Systems:  As noted in HPI.        Objective   Mental Status Exam  General Appearance: Well groomed, appropriate eye contact  Attitude/Behavior: Cooperative  Motor: No psychomotor agitation or retardation, no tremor or other abnormal movements  Speech: Normal rate, volume, prosody  Mood: \"Better.\"  Affect: Congruent with mood and topic of conversation  Thought Process: Linear, goal directed  Thought Associations: No loosening of associations  Thought Content: Normal, Other: (comment) (some ongoing anxious themes)  Perception: No perceptual abnormalities noted  Sensorium: Alert and oriented to person, place, time and situation  Insight: Intact  Judgement: Intact  Cognition: Cognitively intact to conversational testing with respect to attention, orientation, fund of knowledge, recent and remote memory, and language    Vitals:  There were no vitals filed for this visit.    Labs:  Lab on 01/10/2025   Component Date Value    Thyroxine, Free 01/10/2025 1.20 (H)     Vitamin D, " 25-Hydroxy, T* 01/10/2025 37     Insulin, Fasting 01/10/2025 11     Glucose, Fasting 01/10/2025 91     Cortisol  A.M. 01/10/2025 24.0 (H)     Adrenocorticotropic Horm* 01/10/2025 45.9     Thyroid Stimulating Horm* 01/10/2025 0.13 (L)     Prolactin 01/10/2025 9.0          Dominique Hoang, APRN-CNP, APRN-CNS

## 2025-02-05 ENCOUNTER — APPOINTMENT (OUTPATIENT)
Dept: BEHAVIORAL HEALTH | Facility: CLINIC | Age: 60
End: 2025-02-05
Payer: COMMERCIAL

## 2025-02-06 ENCOUNTER — TELEPHONE (OUTPATIENT)
Dept: PRIMARY CARE | Facility: CLINIC | Age: 60
End: 2025-02-06
Payer: COMMERCIAL

## 2025-02-06 DIAGNOSIS — Z00.00 HEALTHCARE MAINTENANCE: Primary | ICD-10-CM

## 2025-02-07 ENCOUNTER — APPOINTMENT (OUTPATIENT)
Dept: ORTHOPEDIC SURGERY | Facility: CLINIC | Age: 60
End: 2025-02-07
Payer: COMMERCIAL

## 2025-02-07 ENCOUNTER — HOSPITAL ENCOUNTER (OUTPATIENT)
Dept: RADIOLOGY | Facility: EXTERNAL LOCATION | Age: 60
Discharge: HOME | End: 2025-02-07

## 2025-02-07 DIAGNOSIS — M18.0 ARTHRITIS OF CARPOMETACARPAL (CMC) JOINT OF BOTH THUMBS: Primary | ICD-10-CM

## 2025-02-07 RX ORDER — OXYCODONE HYDROCHLORIDE 5 MG/1
5 TABLET ORAL EVERY 8 HOURS PRN
Qty: 9 TABLET | Refills: 0 | Status: SHIPPED | OUTPATIENT
Start: 2025-02-07 | End: 2025-02-10

## 2025-02-07 NOTE — PROGRESS NOTES
Subjective   Rachel Gomez is a 59 y.o. female who presents for Follow-up of the Left Hand and Follow-up of the Right Hand    HPI    2/7/25: Patient returns today with complaint of bilateral thumb pain.  She has known bilateral thumb CMC joint DJD.  During her last visit on 7/15/2024, discussed the option of PRP injection therapy.  She returns today for those injections.  No additional complaints.    7/15/24: 58-year-old right-hand-dominant female referred by Dr. Felix presents with complaint of bilateral thumb pain that she is had for quite some time but progressive worse over the past 2 weeks.  Patient has known bilateral thumb CMC joint DJD with left greater than right.  She did have a procedure with Dr. Tinsley in 2022.  She does continue to have significant pain.  She has had multiple corticosteroid injections, most recent injections performed proximally 2 months ago.  She has been having diminished response from these injections and would like to avoid surgery, therefore she was referred to discuss additional treatment options, specifically PRP.    ROS: All pertinent positive symptoms are included in the history of present illness.    All other systems have been reviewed and are negative and noncontributory to this patient's current ailments.    Objective     There were no vitals filed for this visit.      Physical Exam  General/Constitutional: No apparent distress. Well-nourished and well developed.  Head: Normocephalic, Atraumatic.   Eyes: EOMI.  Vascular: No edema, swelling or tenderness, except as noted in detailed exam.  Respiratory: Non-labored breathing.  Integumentary: No impressive skin lesions present, except as noted in detailed exam.  Neurological: Alert and oriented.  Psychological:  Normal mood and affect.  Musculoskeletal: Normal, except as noted in detailed exam.  Right hand: No rash.  No deformity.  No erythema.  Full  strength.  Gross sensation intact throughout.  Tenderness over the  first CMC joint with palpable crepitus.  Left hand: No rash.  No deformity.  No erythema.  Full  strength.  Well-healed surgical incision.  Gross sensation intact throughout.  Tenderness over the first CMC joint with palpable crepitus.    Platelet Rich Plasma Injection-right thumb CMC joint  Lot: [id number]  Exp: 10/31/2026  Consent  After discussing the various treatment options for the condition,  It was agreed that a platelet rich plasma injection would the next step in treatment.  The nature of and the indications for PRP injections and / or local anaesthetic injection were reviewed in detail with the patient today.  The inherent risks of injection including infection, allergic reaction, increased pain, incomplete relief or temporary relief of symptoms, and nerve injury were discussed.      Procedure  After the risks and benefits of the procedure were explained, consent was given, and time-out was performed. The [employer] system was used with 8mL ACDA, 52cc of blood was drawn from the patient's antecubital fossa.  This blood was spun for 20 minutes in the centrifuge to obtain 1.5 cc of Leukocyte Poor PRP.  The right thumb CMC joint and surrounding structures were visualized with ultrasound.   The site for the injection was properly marked and prepped with Chlorhexadine solution.       The needle injection insertion site was anesthetized with ethyl chloride and 1cc of 1% Lidocaine using a 25 gauge 1.5 inch needle.  Using ultrasound guidance, the CMC joint was visualized and the 1.5 cc of PRP was then injected into the joint 22-gauge 1.5 inch needle.      A sterile band-aide was applied.  Post-injection instructions were given regarding post-procedure care, when to follow up in clinic and what to expect from the procedure.  The patient tolerated the injection well and was discharged without complication.      Platelet Rich Plasma Injection-left thumb CMC joint  Lot: [id number]  Exp:  10/31/2026  Consent  After discussing the various treatment options for the condition,  It was agreed that a platelet rich plasma injection would the next step in treatment.  The nature of and the indications for PRP injections and / or local anaesthetic injection were reviewed in detail with the patient today.  The inherent risks of injection including infection, allergic reaction, increased pain, incomplete relief or temporary relief of symptoms, and nerve injury were discussed.      Procedure  After the risks and benefits of the procedure were explained, consent was given, and time-out was performed. The [employer] system was used with 8mL ACDA, 52cc of blood was drawn from the patient's antecubital fossa.  This blood was spun for 20 minutes in the centrifuge to obtain 1.5 cc of Leukocyte Poor PRP.  The CMC joint and surrounding structures were visualized with ultrasound.   The site for the injection was properly marked and prepped with Chlorhexadine solution.       The needle injection insertion site was anesthetized with ethyl chloride and 1cc of 1% Lidocaine using a 25 gauge 1.5 inch needle.  Using ultrasound guidance, the CMC joint was visualized and the 1.5 cc of PRP was then injected into the joint 22-gauge 1.5 inch needle.      A sterile band-aide was applied.  Post-injection instructions were given regarding post-procedure care, when to follow up in clinic and what to expect from the procedure.  The patient tolerated the injection well and was discharged without complication.            Assessment/Plan   Problem List Items Addressed This Visit    None  Visit Diagnoses       Arthritis of carpometacarpal (CMC) joint of both thumbs    -  Primary    Relevant Medications    oxyCODONE (Roxicodone) 5 mg immediate release tablet    Other Relevant Orders    Point of Care Ultrasound (Completed)          Discussed risks versus benefits of having injections performed. Patient has elected to proceed with procedures.  Please refer to procedure notes above. Discussed with patient to limit high weight resistance activities over the next 24-48 hours, they can then progress to light activities as tolerated. Discussed with patient to avoid water submersion over the next 48 hours. Discussed with patient to call me immediately if they develop worsening pain, rash, erythema, or fevers. Discussed extensively with patient to avoid anti-inflammatories, ice, and steroids for the next 2 weeks. I've given them a prescription for Oxycodone 5mg  every 8 hours as needed for pain control. Patient should follow-up as needed if pain persists or worsens.    Dontrell Plunkett, DO  Sports Medicine  [facility], [facility]    ** Please excuse any errors in grammar or translation related to this dictation. Voice recognition software was utilized to prepare this document. **

## 2025-02-11 ENCOUNTER — SPECIALTY PHARMACY (OUTPATIENT)
Dept: PHARMACY | Facility: CLINIC | Age: 60
End: 2025-02-11

## 2025-02-11 PROCEDURE — RXMED WILLOW AMBULATORY MEDICATION CHARGE

## 2025-02-13 ENCOUNTER — PHARMACY VISIT (OUTPATIENT)
Dept: PHARMACY | Facility: CLINIC | Age: 60
End: 2025-02-13
Payer: COMMERCIAL

## 2025-02-14 LAB
ALBUMIN SERPL-MCNC: 4.3 G/DL (ref 3.6–5.1)
ALP SERPL-CCNC: 100 U/L (ref 37–153)
ALT SERPL-CCNC: 10 U/L (ref 6–29)
ANION GAP SERPL CALCULATED.4IONS-SCNC: 9 MMOL/L (CALC) (ref 7–17)
AST SERPL-CCNC: 15 U/L (ref 10–35)
BILIRUB SERPL-MCNC: 0.3 MG/DL (ref 0.2–1.2)
BUN SERPL-MCNC: 17 MG/DL (ref 7–25)
CALCIUM SERPL-MCNC: 9.7 MG/DL (ref 8.6–10.4)
CHLORIDE SERPL-SCNC: 107 MMOL/L (ref 98–110)
CHOLEST SERPL-MCNC: 155 MG/DL
CHOLEST/HDLC SERPL: 2.9 (CALC)
CO2 SERPL-SCNC: 25 MMOL/L (ref 20–32)
CREAT SERPL-MCNC: 0.82 MG/DL (ref 0.5–1.03)
EGFRCR SERPLBLD CKD-EPI 2021: 83 ML/MIN/1.73M2
ERYTHROCYTE [DISTWIDTH] IN BLOOD BY AUTOMATED COUNT: 14.3 % (ref 11–15)
GLUCOSE SERPL-MCNC: 100 MG/DL (ref 65–99)
HCT VFR BLD AUTO: 35.7 % (ref 35–45)
HDLC SERPL-MCNC: 54 MG/DL
HGB BLD-MCNC: 10.9 G/DL (ref 11.7–15.5)
LDLC SERPL CALC-MCNC: 74 MG/DL (CALC)
MCH RBC QN AUTO: 26.2 PG (ref 27–33)
MCHC RBC AUTO-ENTMCNC: 30.5 G/DL (ref 32–36)
MCV RBC AUTO: 85.8 FL (ref 80–100)
NONHDLC SERPL-MCNC: 101 MG/DL (CALC)
PLATELET # BLD AUTO: 251 THOUSAND/UL (ref 140–400)
PMV BLD REES-ECKER: 11.3 FL (ref 7.5–12.5)
POTASSIUM SERPL-SCNC: 4.4 MMOL/L (ref 3.5–5.3)
PROT SERPL-MCNC: 6.8 G/DL (ref 6.1–8.1)
RBC # BLD AUTO: 4.16 MILLION/UL (ref 3.8–5.1)
SODIUM SERPL-SCNC: 141 MMOL/L (ref 135–146)
TRIGL SERPL-MCNC: 172 MG/DL
WBC # BLD AUTO: 6.5 THOUSAND/UL (ref 3.8–10.8)

## 2025-02-17 ENCOUNTER — APPOINTMENT (OUTPATIENT)
Dept: PRIMARY CARE | Facility: CLINIC | Age: 60
End: 2025-02-17
Payer: COMMERCIAL

## 2025-02-17 VITALS
DIASTOLIC BLOOD PRESSURE: 60 MMHG | HEART RATE: 76 BPM | SYSTOLIC BLOOD PRESSURE: 132 MMHG | TEMPERATURE: 98 F | BODY MASS INDEX: 38.24 KG/M2 | WEIGHT: 243.6 LBS | OXYGEN SATURATION: 97 % | RESPIRATION RATE: 16 BRPM | HEIGHT: 67 IN

## 2025-02-17 DIAGNOSIS — I10 ESSENTIAL (PRIMARY) HYPERTENSION: ICD-10-CM

## 2025-02-17 DIAGNOSIS — R73.03 PREDIABETES: ICD-10-CM

## 2025-02-17 DIAGNOSIS — D35.2 PITUITARY ADENOMA (MULTI): ICD-10-CM

## 2025-02-17 DIAGNOSIS — E78.2 MIXED HYPERLIPIDEMIA: ICD-10-CM

## 2025-02-17 DIAGNOSIS — Z00.00 HEALTHCARE MAINTENANCE: Primary | ICD-10-CM

## 2025-02-17 DIAGNOSIS — Z12.31 BREAST CANCER SCREENING BY MAMMOGRAM: ICD-10-CM

## 2025-02-17 DIAGNOSIS — E23.6 RATHKE'S CLEFT CYST (MULTI): ICD-10-CM

## 2025-02-17 PROCEDURE — 99396 PREV VISIT EST AGE 40-64: CPT | Performed by: INTERNAL MEDICINE

## 2025-02-17 PROCEDURE — 3075F SYST BP GE 130 - 139MM HG: CPT | Performed by: INTERNAL MEDICINE

## 2025-02-17 PROCEDURE — 3078F DIAST BP <80 MM HG: CPT | Performed by: INTERNAL MEDICINE

## 2025-02-17 PROCEDURE — 3008F BODY MASS INDEX DOCD: CPT | Performed by: INTERNAL MEDICINE

## 2025-02-17 RX ORDER — METFORMIN HYDROCHLORIDE 500 MG/1
500 TABLET, EXTENDED RELEASE ORAL
Qty: 90 TABLET | Refills: 3 | Status: SHIPPED | OUTPATIENT
Start: 2025-02-17 | End: 2026-02-17

## 2025-02-17 ASSESSMENT — ENCOUNTER SYMPTOMS
DIZZINESS: 0
JOINT SWELLING: 0
AGITATION: 0
NEUROLOGICAL NEGATIVE: 1
ABDOMINAL PAIN: 0
COUGH: 0
WOUND: 0
NERVOUS/ANXIOUS: 0
EYES NEGATIVE: 1
HEMATOLOGIC/LYMPHATIC NEGATIVE: 1
EYE DISCHARGE: 0
RESPIRATORY NEGATIVE: 1
SEIZURES: 0
VOICE CHANGE: 0
NUMBNESS: 0
CHEST TIGHTNESS: 0
PALPITATIONS: 0
NAUSEA: 0
MUSCULOSKELETAL NEGATIVE: 1
CONFUSION: 0
VOMITING: 0
HEADACHES: 0
CONSTITUTIONAL NEGATIVE: 1
CONSTIPATION: 0
MYALGIAS: 0
STRIDOR: 0
FLANK PAIN: 0
BACK PAIN: 0
LIGHT-HEADEDNESS: 0
SINUS PAIN: 0
WHEEZING: 0
ENDOCRINE NEGATIVE: 1
HALLUCINATIONS: 0
BLOOD IN STOOL: 0
CARDIOVASCULAR NEGATIVE: 1
UNEXPECTED WEIGHT CHANGE: 0
SORE THROAT: 0
EYE REDNESS: 0
SINUS PRESSURE: 0
APPETITE CHANGE: 0
TROUBLE SWALLOWING: 0
NECK PAIN: 0
FACIAL ASYMMETRY: 0
POLYPHAGIA: 0
ALLERGIC/IMMUNOLOGIC NEGATIVE: 1
DIFFICULTY URINATING: 0
POLYDIPSIA: 0
DIARRHEA: 0
ARTHRALGIAS: 0
SLEEP DISTURBANCE: 0
DYSURIA: 0
PSYCHIATRIC NEGATIVE: 1
COLOR CHANGE: 0
GASTROINTESTINAL NEGATIVE: 1
BRUISES/BLEEDS EASILY: 0
SHORTNESS OF BREATH: 0
NECK STIFFNESS: 0
SPEECH DIFFICULTY: 0
ACTIVITY CHANGE: 0
TREMORS: 0
ADENOPATHY: 0
FREQUENCY: 0
WEAKNESS: 0
EYE PAIN: 0

## 2025-02-17 NOTE — PROGRESS NOTES
Subjective   Patient ID: Rachel Gomez is a 59 y.o. female who presents for Annual Exam (Pt is here due to annual physical).    HPI     Patient comes for Physical Exam.      Patient has been feeling pretty good and has been complaint with prescribed medications.    We reviewed blood work including CBC, CMP, TSH, Lipid Panel done in Jan 2025.  Results within acceptable range.  Mildly elevated glucose and TG noted.  Her endocrinologist advised Metformin 500 mg which she started about a month ago.  C/o nausea, upset stomach, lose stools/diarrhea since Metformin started.  Will switch to Metformin XR.     Mammogram: 7/2024  Colon ca screening: cologuard: 2019  PAP: per GYN        Patient  has h/o Hypothyroidism, HLD, Obesity, Anxiety, Depression, Bipolar disorder, OCD, Migraines, GERD, pituitary adenoma. Follows with endocrinologist and psychiatrist.      She has been c/o multiple joints pain, was evaluated by rheumatologist, no connective tissue disease identified.  Diagnosed with fibromyalgia, advised to see [facility] providers.      Patient  has difficulties losing weight.     C/o GERD symptoms, has been taking PPI daily, tried to wean off but symptoms have been coming back.       Review of Systems   Constitutional: Negative.  Negative for activity change, appetite change and unexpected weight change.   HENT: Negative.  Negative for congestion, ear discharge, ear pain, hearing loss, mouth sores, nosebleeds, sinus pressure, sinus pain, sore throat, trouble swallowing and voice change.    Eyes: Negative.  Negative for pain, discharge, redness and visual disturbance.   Respiratory: Negative.  Negative for cough, chest tightness, shortness of breath, wheezing and stridor.    Cardiovascular: Negative.  Negative for chest pain, palpitations and leg swelling.   Gastrointestinal: Negative.  Negative for abdominal pain, blood in stool, constipation, diarrhea, nausea and vomiting.   Endocrine: Negative.  Negative for  "polydipsia, polyphagia and polyuria.   Genitourinary: Negative.  Negative for difficulty urinating, dysuria, flank pain, frequency and urgency.   Musculoskeletal: Negative.  Negative for arthralgias, back pain, gait problem, joint swelling, myalgias, neck pain and neck stiffness.   Skin: Negative.  Negative for color change, rash and wound.   Allergic/Immunologic: Negative.  Negative for environmental allergies, food allergies and immunocompromised state.   Neurological: Negative.  Negative for dizziness, tremors, seizures, syncope, facial asymmetry, speech difficulty, weakness, light-headedness, numbness and headaches.   Hematological: Negative.  Negative for adenopathy. Does not bruise/bleed easily.   Psychiatric/Behavioral: Negative.  Negative for agitation, behavioral problems, confusion, hallucinations, sleep disturbance and suicidal ideas. The patient is not nervous/anxious.    All other systems reviewed and are negative.      Objective   /60 (BP Location: Left arm, Patient Position: Sitting, BP Cuff Size: Large adult)   Pulse 76   Temp 36.7 °C (98 °F) (Temporal)   Resp 16   Ht 1.702 m (5' 7\")   Wt 110 kg (243 lb 9.6 oz)   SpO2 97%   BMI 38.15 kg/m²     Physical Exam  Vitals and nursing note reviewed.   Constitutional:       General: She is not in acute distress.     Appearance: Normal appearance.   HENT:      Head: Normocephalic and atraumatic.      Right Ear: Tympanic membrane, ear canal and external ear normal.      Left Ear: Tympanic membrane, ear canal and external ear normal.      Nose: Nose normal. No congestion or rhinorrhea.      Mouth/Throat:      Mouth: Mucous membranes are moist.      Pharynx: Oropharynx is clear.   Eyes:      General:         Right eye: No discharge.         Left eye: No discharge.      Extraocular Movements: Extraocular movements intact.      Conjunctiva/sclera: Conjunctivae normal.      Pupils: Pupils are equal, round, and reactive to light.   Cardiovascular:      " Rate and Rhythm: Normal rate and regular rhythm.      Pulses: Normal pulses.      Heart sounds: Normal heart sounds. No murmur heard.     No friction rub. No gallop.   Pulmonary:      Effort: Pulmonary effort is normal. No respiratory distress.      Breath sounds: Normal breath sounds. No stridor. No wheezing, rhonchi or rales.   Chest:      Chest wall: No tenderness.   Abdominal:      General: Bowel sounds are normal.      Palpations: Abdomen is soft. There is no mass.      Tenderness: There is no abdominal tenderness. There is no guarding or rebound.   Musculoskeletal:         General: No swelling or deformity. Normal range of motion.      Cervical back: Normal range of motion and neck supple. No rigidity or tenderness.      Right lower leg: No edema.      Left lower leg: No edema.   Lymphadenopathy:      Cervical: No cervical adenopathy.   Skin:     General: Skin is warm and dry.      Coloration: Skin is not jaundiced.      Findings: No bruising or erythema.   Neurological:      General: No focal deficit present.      Mental Status: She is alert and oriented to person, place, and time. Mental status is at baseline.      Cranial Nerves: No cranial nerve deficit.      Motor: No weakness.      Coordination: Coordination normal.      Gait: Gait normal.   Psychiatric:         Mood and Affect: Mood normal.         Behavior: Behavior normal.         Thought Content: Thought content normal.         Judgment: Judgment normal.         Assessment/Plan   Problem List Items Addressed This Visit             ICD-10-CM       Cardiac and Vasculature    Hyperlipidemia E78.5     Clinically stable. Continue statin.         Essential (primary) hypertension I10     Controlled. Continue current treatment.             Endocrine/Metabolic    Pituitary adenoma (Multi) D35.2     Clinically stable. F/up with endocrinology.         Rathke's cleft cyst (Multi) E23.6     Clinically stable. Will monitor.          Prediabetes R73.03     Low  carbohydrate diet is advised.         Relevant Medications    metFORMIN XR (Glucophage-XR) 500 mg 24 hr tablet       Health Encounters    Healthcare maintenance - Primary Z00.00     Other Visit Diagnoses         Codes    Breast cancer screening by mammogram     Z12.31    Relevant Orders    BI mammo bilateral screening tomosynthesis          It was a pleasure to see you today.  I would like to remind you about importance of a healthy lifestyle in order to improve your well-being and live longer.  Try to engage in physical activities for at least 150 minutes per week.  Eat about 10 servings of fruits and vegetables daily. My advice is 2 servings of fruits and 8 servings of vegetables.  For vegetables choose at least half of them green and at least half of them fresh.  Please avoid sugar, salt, fried food and saturated fat.      F/up in 1 year or sooner if needed.

## 2025-02-20 ENCOUNTER — HOSPITAL ENCOUNTER (OUTPATIENT)
Dept: RADIOLOGY | Facility: CLINIC | Age: 60
Discharge: HOME | End: 2025-02-20
Payer: COMMERCIAL

## 2025-02-20 ENCOUNTER — APPOINTMENT (OUTPATIENT)
Dept: RHEUMATOLOGY | Facility: CLINIC | Age: 60
End: 2025-02-20
Payer: COMMERCIAL

## 2025-02-20 VITALS
RESPIRATION RATE: 16 BRPM | TEMPERATURE: 98.5 F | SYSTOLIC BLOOD PRESSURE: 120 MMHG | BODY MASS INDEX: 38.24 KG/M2 | OXYGEN SATURATION: 97 % | HEART RATE: 61 BPM | DIASTOLIC BLOOD PRESSURE: 83 MMHG | HEIGHT: 67 IN | WEIGHT: 243.6 LBS

## 2025-02-20 DIAGNOSIS — M79.641 PAIN OF RIGHT HAND: ICD-10-CM

## 2025-02-20 DIAGNOSIS — M19.049 CMC ARTHRITIS: ICD-10-CM

## 2025-02-20 DIAGNOSIS — M79.641 PAIN OF RIGHT HAND: Primary | ICD-10-CM

## 2025-02-20 PROCEDURE — 3074F SYST BP LT 130 MM HG: CPT | Performed by: INTERNAL MEDICINE

## 2025-02-20 PROCEDURE — 3079F DIAST BP 80-89 MM HG: CPT | Performed by: INTERNAL MEDICINE

## 2025-02-20 PROCEDURE — 3008F BODY MASS INDEX DOCD: CPT | Performed by: INTERNAL MEDICINE

## 2025-02-20 PROCEDURE — 73130 X-RAY EXAM OF HAND: CPT | Mod: RT

## 2025-02-20 PROCEDURE — 99214 OFFICE O/P EST MOD 30 MIN: CPT | Performed by: INTERNAL MEDICINE

## 2025-02-20 ASSESSMENT — PATIENT HEALTH QUESTIONNAIRE - PHQ9
SUM OF ALL RESPONSES TO PHQ9 QUESTIONS 1 AND 2: 0
1. LITTLE INTEREST OR PLEASURE IN DOING THINGS: NOT AT ALL
2. FEELING DOWN, DEPRESSED OR HOPELESS: NOT AT ALL

## 2025-02-20 ASSESSMENT — PAIN SCALES - GENERAL: PAINLEVEL_OUTOF10: 6

## 2025-02-20 NOTE — PROGRESS NOTES
Subjective   Patient ID: Rachel Gomez is a 59 y.o. female who presents for Follow-up (6 month f/u).    HPI 59-year-old right-handed female here for follow-up regarding OA of the first CMC joint of both hands.    She has a history of OA of the first CMC joint of both hands, left greater than right.  She had surgery on the left thumb basilar joint February 2022 by Dr. Karan Tinsley.  Procedure consisted of arthrotomy, biopsy of joint capsule, removal 2 loose bodies, excision of osteophyte/bone spur/bone cyst.     She still has persistent pain in the area.  She started meloxicam 15 mg daily April 2022 because Advil was not working.  She also takes omeprazole 20 mg daily for GERD, which is currently well controlled.  She tried to wean the meloxicam, but pain worsened.  She does get some definite relief from the meloxicam.    She saw Dr. Felix for second opinion November 6, 2023.  He gave her a corticosteroid injection of both first CMC joints, which did give her some relief.  She had injections again 5/06/24- right is slightly better, left did not help.  He suggested that she consider PRP injections from Dr. Plunkett.    She had Kenalog injections of the first CMC joint bilaterally again August 6, 2024.    She had PRP injections in 1st CMC of both hands 2/07/25.  She found the right to be extremely painful during and after the injection.  She still c/o sharp pain at base of right thumb (worse since the injection).  She was required to hold meloxicam for 1 week before the procedure and 2 weeks after.  She was told she can resume the meloxicam 15 mg daily tomorrow.    She was previously told she may need a second surgical procedure, which involves a tendon transposition, for the left first CMC joint.     She had EGD April 2023 which showed small to medium size hiatal hernia, tortuous esophagus, gastritis, multiple gastric polyps.  Biopsies did not show any evidence of Veloz's esophagus.    Her symptoms of urinary  "frequency and needing to get up at nighttime to urinate have improved with addition of Myrbetriq .    labs 9/20: ALFONZO negative, rheumatoid factor negative, citrulline antibody negative, ESR 25, CRP 0.88 (normal less than 1), uric acid 4.8  Labs December 2021: TSH 0.38  Labs 2/22: Hemoglobin 11.7, hematocrit 37.6, WBC 5.5, platelets 203, CMP normal, 25-hydroxy vitamin D 27  Labs February 2023: ALFONZO negative, rheumatoid factor negative, Citrulline antibody negative, ESR 29, CRP 0.54, 25-hydroxy vitamin D 66, cholesterol 166, HDL 58, LDL 85, triglycerides 112  Labs July 2023: 25-hydroxy vitamin D 66  Labs August 2023: CMP normal except glucose 103  Labs February 2024: CMP normal except alkaline phosphatase 121 and glucose 100, CBC normal  Labs August 2024: CMP normal  Labs February 2025: CMP normal except glucose 100, CBC normal, cholesterol 155, HDL 54, LDL 74, triglycerides 172     Medical problem list:   -Migraine headaches   -Bipolar disorder    -Hypothyroidism   -Hyperlipidemia   - GERD     Medications: Reviewed as documented  Surgeries: Vaginal hysterectomy    Resection of pituitary macroadenoma 2012     Allergies: Reviewed as documented     Social history: .   Denies use of tobacco or alcohol.   Occupation: Homemaker.     Family history: Mother-arthritis    ROS:  General: Denies fevers or chills.  CV: Denies chest pain or palpitations.  Denies leg edema.  Lungs: Denies coughing or shortness of breath.  Skin: Denies rashes or nodules.  MS: c/o pain at the base of both thumbs.    Objective   /83 (BP Location: Left arm, Patient Position: Sitting, BP Cuff Size: Adult)   Pulse 61   Temp 36.9 °C (98.5 °F) (Skin)   Resp 16   Ht 1.702 m (5' 7\")   Wt 110 kg (243 lb 9.6 oz)   SpO2 97%   BMI 38.15 kg/m²     Physical Exam  HEENT: PERRL, EOMI  Neck: Supple, no nodes.  CV: RRR, no MGR.  Lungs: Clear, no rales or wheezes.  Abdomen: Soft, nontender. No hepatosplenomegaly.  Extremities:  No cyanosis, clubbing, " or edema.  MS: No synovitis.  Bony prominence of first CMC joint bilaterally, consistent with OA. Tender 1st CMC joint bilaterally.  Skin: No rashes or nodules.      Assessment/Plan     Problem List Items Addressed This Visit             ICD-10-CM    CMC arthritis M19.049    BMI 38.0-38.9,adult Z68.38     Other Visit Diagnoses         Codes    Pain of right hand    -  Primary M79.641    Relevant Orders    XR hand right 3+ views (Completed)            OA left 1st CMC - s/p surgical procedure February 2022 consisting of arthrotomy, biopsy of joint capsule, removal 2 loose bodies, excision of osteophyte/bone spur/bone cyst over the dorsal base of first metacarpal of the left thumb. She has had persistent pain since the procedure. She was not getting relief with Advil. Meloxicam does help.  She had injection of first CMC joint bilaterally November 6, 2023, 5/24, and 8/24  by Dr. Felix,  She had PRP injections from Dr. Plunkett 2/07/25, which seemed to make pain worse. She was told she can resume meloxicam tomorrow.  She is ultimately going to get surgery on left hand early 2025.    GERD-well-controlled with omeprazole.    Urinary frequency- better with Myrbetriq.    BMI 38- worse. She will continue to work on weight reduction.    Plan:  Xray right hand.  Resume meloxicam 15 mg daily tomorrow.  Try carpal tunnel splint on right hand.  Send me a  message if not feeling better in 2 weeks.  Follow-up in 6 months.

## 2025-02-20 NOTE — PATIENT INSTRUCTIONS
Xray right hand.  Resume meloxicam 15 mg daily tomorrow.  Try carpal tunnel splint on right hand.  Send me a  message if not feeling better in 2 weeks.  Follow-up in 6 months.

## 2025-02-21 DIAGNOSIS — G43.719 INTRACTABLE CHRONIC MIGRAINE WITHOUT AURA AND WITHOUT STATUS MIGRAINOSUS: Primary | ICD-10-CM

## 2025-02-21 RX ORDER — PROPRANOLOL HYDROCHLORIDE 60 MG/1
60 TABLET ORAL DAILY
COMMUNITY
End: 2025-02-21 | Stop reason: SDUPTHER

## 2025-02-21 NOTE — TELEPHONE ENCOUNTER
Patient called in stating that she needs a refill of her Propranolol ER 60 mg and she takes 1 pill daily. I do not see it on her med list though and she stated it was last filled 11/24. Do you still want her to take this and if so can you send over to her pharmacy?

## 2025-02-24 RX ORDER — PROPRANOLOL HYDROCHLORIDE 60 MG/1
60 TABLET ORAL DAILY
Qty: 90 TABLET | Refills: 3 | Status: SHIPPED | OUTPATIENT
Start: 2025-02-24

## 2025-03-06 ENCOUNTER — APPOINTMENT (OUTPATIENT)
Dept: BEHAVIORAL HEALTH | Facility: CLINIC | Age: 60
End: 2025-03-06
Payer: COMMERCIAL

## 2025-03-06 DIAGNOSIS — F33.1 MODERATE EPISODE OF RECURRENT MAJOR DEPRESSIVE DISORDER: ICD-10-CM

## 2025-03-06 DIAGNOSIS — G47.09 OTHER INSOMNIA: ICD-10-CM

## 2025-03-06 DIAGNOSIS — F41.8 ANXIETY WITH SOMATIC FEATURES: ICD-10-CM

## 2025-03-06 DIAGNOSIS — F42.8 OTHER OBSESSIVE-COMPULSIVE DISORDERS: ICD-10-CM

## 2025-03-06 PROCEDURE — 99213 OFFICE O/P EST LOW 20 MIN: CPT | Performed by: CLINICAL NURSE SPECIALIST

## 2025-03-06 NOTE — PROGRESS NOTES
Outpatient Psychiatry      Subjective   Rachel Gomez, is a 59 y.o. female,  seen in follow-up.      Assessment/Plan   Problem List Items Addressed This Visit             ICD-10-CM    Anxiety with somatic features F41.8     Tolerating and benefiting from current regimen. No indication for medication changes today.      Continue buspirone 5 mg PO BID- refill sent to pharmacy today.   Continue lorazepam 0.5 mg PO daily PRN (#10 tabs with 1 refill)- last fill 12/18/2024, no refill needed today.   Continue sertraline 200 mg PO daily- no refill needed today.    Not currently interested in individual therapy. Aware that I can assist with referrals.     Reviewed OARRS, no discrepancies or concerns. Discussed risk of motor/cognitive impairment, sedation, dependence, tolerance, abuse, withdrawal sequelae, accidental overdose, life-threatening respiratory depression (leroy. in combination with alcohol and/or opioids), excess sedation in combination with other sedating medicines.           Relevant Medications    busPIRone (Buspar) 5 mg tablet    Other Relevant Orders    Follow Up In Psychiatry    Moderate episode of recurrent major depressive disorder F33.1     Sx partially in remission.     Continue sertraline as noted above.  Continue lamotrigine 200 mg PO daily- no refill needed today.       Advised of potentially fatal rash (i.e. Saucedo-Julio Cesar Syndrome) in 1:10,000 individuals. Agrees to stop medication and seek medical attention immediately if rash or blistering of the mucosal surfaces develops; also discussed risk for benign drug-induced rash, and advised to cease medication and seek immediate medical attention to r/o SJS given its potential lethality. Further advised that the risk for this reaction increases if proper dose titration is not followed, and that after 2 days of non-adherence, re-titration is necessary.          Relevant Orders    Follow Up In Psychiatry    Obsessive-compulsive disorder F42.9     Plan  "as noted above.          Relevant Orders    Follow Up In Psychiatry    Other insomnia G47.09     Resolved- no longer taking doxepin PRN for sleep.          Relevant Orders    Follow Up In Psychiatry       Follow-up in 6-8 weeks.     Risk Assessment:  Risk Assessment: Rachel Gomez is currently a low acute risk of suicide and self-harm due to no past suicide attempt(s) and not currently endorsing thoughts of suicide. Rachel Gomez is currently a low acute risk of violence and harm to others due to no past history of violence and not currently threatening others.  Suicidal Risk Factors:  and current psychiatric illness  Violence Risk Factors: none  Protective Factors: strong coping skills, sense of responsibility towards family, social support/connectedness, moral objections to suicide, positive family relationships, hopefulness/future orientation, and marriage/partnership      Reason for Visit:  Follow-up for medication management.     HPI:  Since her last visit,     Things are, \"not too bad.\"   Has been ~a month since oldest daughter broke up with bf. Hasn't really wanted to talk about it, but shared that she just didn't think it felt right.   Since    Son got a FT job.   Plan was to move back home- roommate is moving out of state- but thinks it may mean he'll only stay with them for a short time, or possibly not at all.    Younger daughter is still not working, lease will be up in 3 months.   Her mood has been much more pleasant, but doesn't seem to have a sense of urgency about what she'll do about finances, housing.   Can't help worrying, but glad that daughter is more approachable and communicating with them better.     Mood is good.   \"Just sick of this weather, like everyone else.\"  Sleeping well.   Energy level has been good- took a little dip ~a month ago, but feeling better now.   Looking forward to weather getting better and being able to walk outside.   Anxious sx have been " "well-controlled.   Hasn't needed lorazepam PRN since last visit.   Tolerated starting buspirone, and feels it's had a positive impact.     Had PRP injections in both hands. L hand went fine, R hand was extremely painful and has persisted. Worse and different than it was prior to procedure    Denies suicidal ideation or a passive death wish.     No new medications or health problems. Lost ~7 lbs since starting metformin.       Current Psychiatric Medications:  Lamotrigine 200 mg PO daily  Sertraline 200 mg PO daily  Buspirone 5 mg PO BID   Lorazepam 0.5 mg PO PRN for anxiety- has taken a few times (~5 tabs)      Record Review: brief     Medical Review Of Systems:  Pertinent items are noted in HPI.    Psychiatric Review Of Systems:  As noted in HPI.        Objective   Mental Status Exam  General Appearance: Well groomed, appropriate eye contact  Attitude/Behavior: Cooperative  Motor: No psychomotor agitation or retardation, no tremor or other abnormal movements  Speech: Normal rate, volume, prosody  Mood: \"I'm doing better.\"  Affect: Congruent with mood and topic of conversation  Thought Process: Linear, goal directed  Thought Associations: No loosening of associations  Thought Content: Normal  Perception: No perceptual abnormalities noted  Sensorium: Alert and oriented to person, place, time and situation  Insight: Intact  Judgement: Intact  Cognition: Cognitively intact to conversational testing with respect to attention, orientation, fund of knowledge, recent and remote memory, and language    Vitals:  There were no vitals filed for this visit.    Labs:  not applicable        Dominique Hoang, APRN-CNP, APRN-CNS  "

## 2025-03-18 RX ORDER — BUSPIRONE HYDROCHLORIDE 5 MG/1
5 TABLET ORAL 2 TIMES DAILY
Qty: 60 TABLET | Refills: 2 | Status: SHIPPED | OUTPATIENT
Start: 2025-03-18 | End: 2025-06-16

## 2025-03-18 NOTE — ASSESSMENT & PLAN NOTE
Sx partially in remission.     Continue sertraline as noted above.  Continue lamotrigine 200 mg PO daily- no refill needed today.       Advised of potentially fatal rash (i.e. Saucedo-Julio Cesar Syndrome) in 1:10,000 individuals. Agrees to stop medication and seek medical attention immediately if rash or blistering of the mucosal surfaces develops; also discussed risk for benign drug-induced rash, and advised to cease medication and seek immediate medical attention to r/o SJS given its potential lethality. Further advised that the risk for this reaction increases if proper dose titration is not followed, and that after 2 days of non-adherence, re-titration is necessary.

## 2025-03-18 NOTE — ASSESSMENT & PLAN NOTE
Tolerating and benefiting from current regimen. No indication for medication changes today.      Continue buspirone 5 mg PO BID- refill sent to pharmacy today.   Continue lorazepam 0.5 mg PO daily PRN (#10 tabs with 1 refill)- last fill 12/18/2024, no refill needed today.   Continue sertraline 200 mg PO daily- no refill needed today.    Not currently interested in individual therapy. Aware that I can assist with referrals.     Reviewed OARRS, no discrepancies or concerns. Discussed risk of motor/cognitive impairment, sedation, dependence, tolerance, abuse, withdrawal sequelae, accidental overdose, life-threatening respiratory depression (leroy. in combination with alcohol and/or opioids), excess sedation in combination with other sedating medicines.

## 2025-04-23 ENCOUNTER — SPECIALTY PHARMACY (OUTPATIENT)
Dept: PHARMACY | Facility: CLINIC | Age: 60
End: 2025-04-23

## 2025-04-23 NOTE — PROGRESS NOTES
"[facility] Clinical Note  Patient Reassessment     Introduction  Rachel Gomez is a 59 y.o. female who is on the specialty pharmacy service for management of: Migraine Core.      [facility]: Emgality     Duration of therapy: Maintenance    The most recent encounter visit with the referring prescriber  María Thayer on 12/4/24 was reviewed.  Pharmacy will continue to collaborate in the care of this patient with the referring prescriber.    Discussion  Rachel was contacted on 4/23/2025 at 3:13 PM for a pharmacy visit with encounter number [id number] from:   [facility]  [address] [place] [id number]  Dept: [phone]  Dept Fax: [phone]  Rachel consented to a/an Telephone visit, which was performed.    Efficacy  Patient has developed new symptoms of condition: No  Patient/caregiver feels medication is affecting the disease state: working well    Goals  Provided education on goals and possible outcomes of therapy:  Adherence with therapy  Timely completion of appropriate labs  Timely and appropriate follow up with provider  Identify and address medication interactions with presciption medications, OTC medications and supplements  Optimize or maintain quality of life  Neurology- Migraine: 50% reduction in migraine days each month from baseline  Decreased use of \"prn\" agents to treat migraine headaches  Improvement in MIDAS score from baseline  Patient has documented target(s) for goals of therapy: No    Tolerance  Patient has experienced side effects from this medication: No  Changes to current therapy regimen: No    The follow-up timeline was discussed. Every person responds to and reacts to therapy differently. Patient should be assessed for efficacy and tolerability in approximately: 3 months    Adherence  Patient Information  Informant: Self (Patient)  Demonstrates Understanding of Importance of Adherence: Yes  Does the " patient have any barriers to self-administration (including physical and mental?): No  Medication Information  Medication: galcanezumab-gnlm (Emgality)  Patient Reported Missed Doses in the Last 4 Weeks: 0  Estimated Medication Adherence Level: Good  Adherence Estimation Source: Claims history  Barriers to Adherence: No Problems identified   The importance of adherence was discussed and patient/caregiver was advised to take the medication as prescribed by their provider. Encouraged patient/caregiver to call physician's office or specialty pharmacy if they have a question regarding a missed dose.    General Assessment  Changes to home medications, OTCs or supplements: No  Current Medications[1]  Reported new allergies: No  Reported new medical conditions: No  Additional monitoring reviewed: Neurology - Migraine - There are no routine laboratory monitoring parameters for this medication  Is laboratory follow up needed? No    Advised to contact the pharmacy if there are any changes to the patient's medication list, including prescriptions, OTC medications, herbal products, or supplements.    Impression/Plan  This patient has not been identified as high risk due to Lack of high risk qualifiers.  The following action was taken: N/A.    QOL/Patient Satisfaction  Rate your quality of life on scale of 1-10: 10 - Completely satisfied  Rate your satisfaction with  Specialty Pharmacy on scale of 1-10: 10 - Completely satisfied    Provided contact information ([phone]) for [facility] and reviewed dispensing process, refill timeline and patient management follow up. Confirmed understanding of education conducted during assessment. All questions and concerns were addressed and patient/caregiver was encouraged to reach out for additional questions or concerns.    Based on the patient's diagnosis, medication list, progress towards goals, adherence, tolerance, and medication list, medication  remains appropriate: Therapy remains appropriate (I attest)    Lore CruzD        [1]   Current Outpatient Medications   Medication Sig Dispense Refill    atorvastatin (Lipitor) 40 mg tablet Take 1 tablet (40 mg) by mouth once daily at bedtime. 90 tablet 3    busPIRone (Buspar) 5 mg tablet Take 1 tablet (5 mg) by mouth 2 times a day. 60 tablet 2    ergocalciferol (Vitamin D-2) 1.25 MG (37830 UT) capsule Take 1 capsule (50,000 Units) by mouth 1 (one) time per week. Taking 10,000 ut 2x week      galcanezumab (Emgality) 120 mg/mL auto-injector INJECT 120MG (1 PEN) UNDER THE SKIN ONCE MONTHLY. 3 each 3    lamoTRIgine (LaMICtal) 200 mg tablet Take 1 tablet (200 mg) by mouth once daily. 90 tablet 3    levothyroxine (Synthroid) 137 mcg tablet Take 1 tablet (137 mcg) by mouth once daily. 90 tablet 3    LORazepam (Ativan) 0.5 mg tablet Take 1 tablet (0.5 mg) by mouth once daily as needed for anxiety. 10 tablet 1    meloxicam (Mobic) 15 mg tablet TAKE 1 TABLET BY MOUTH DAILY WITH FOOD AS NEEDED 90 tablet 1    metFORMIN XR (Glucophage-XR) 500 mg 24 hr tablet Take 1 tablet (500 mg) by mouth once daily with breakfast. Do not crush, chew, or split. 90 tablet 3    Myrbetriq 25 mg tablet extended release 24 hr 24 hr tablet TAKE 1 TABLET BY MOUTH EVERY DAY 90 tablet 1    omeprazole (PriLOSEC) 20 mg DR capsule Take 1 capsule (20 mg) by mouth once daily.      propranolol (Inderal) 60 mg tablet Take 1 tablet (60 mg) by mouth once daily. 90 tablet 3    sertraline (Zoloft) 100 mg tablet Take 2 tablets (200 mg) by mouth once daily. 180 tablet 3    Shingrix, PF, 50 mcg/0.5 mL vaccine       ZOLMitriptan (Zomig) 5 mg nasal solution Take 1 puff as needed for migraine headache 6 mL 3     No current facility-administered medications for this visit.

## 2025-04-29 ENCOUNTER — APPOINTMENT (OUTPATIENT)
Dept: OPHTHALMOLOGY | Facility: CLINIC | Age: 60
End: 2025-04-29
Payer: COMMERCIAL

## 2025-04-29 DIAGNOSIS — D35.2 PITUITARY ADENOMA (MULTI): ICD-10-CM

## 2025-04-29 DIAGNOSIS — H52.4 PRESBYOPIA: Primary | ICD-10-CM

## 2025-04-29 DIAGNOSIS — H52.03 HYPERMETROPIA OF BOTH EYES: ICD-10-CM

## 2025-04-29 PROCEDURE — 92083 EXTENDED VISUAL FIELD XM: CPT | Performed by: OPTOMETRIST

## 2025-04-29 PROCEDURE — 92015 DETERMINE REFRACTIVE STATE: CPT | Performed by: OPTOMETRIST

## 2025-04-29 PROCEDURE — 92014 COMPRE OPH EXAM EST PT 1/>: CPT | Performed by: OPTOMETRIST

## 2025-04-29 ASSESSMENT — REFRACTION_MANIFEST
OD_CYLINDER: SPHERE
OD_SPHERE: +0.50
OD_SPHERE: +0.50
OS_ADD: +2.25
OD_CYLINDER: SPHERE
OD_ADD: +2.25
OS_CYLINDER: SPHERE
OS_CYLINDER: SPHERE
OS_SPHERE: +0.50
OS_SPHERE: +0.50

## 2025-04-29 ASSESSMENT — CONF VISUAL FIELD
OD_SUPERIOR_NASAL_RESTRICTION: 0
OD_NORMAL: 1
OS_NORMAL: 1
OS_INFERIOR_TEMPORAL_RESTRICTION: 0
OD_INFERIOR_NASAL_RESTRICTION: 0
OD_SUPERIOR_TEMPORAL_RESTRICTION: 0
OS_SUPERIOR_TEMPORAL_RESTRICTION: 0
METHOD: COUNTING FINGERS
OS_INFERIOR_NASAL_RESTRICTION: 0
OD_INFERIOR_TEMPORAL_RESTRICTION: 0
OS_SUPERIOR_NASAL_RESTRICTION: 0

## 2025-04-29 ASSESSMENT — REFRACTION
OS_ADD: +2.25
OD_ADD: +2.25
OD_SPHERE: +0.50
OD_CYLINDER: SPHERE
OS_SPHERE: +0.50
OS_CYLINDER: SPHERE

## 2025-04-29 ASSESSMENT — ENCOUNTER SYMPTOMS
NEUROLOGICAL NEGATIVE: 0
CONSTITUTIONAL NEGATIVE: 0
GASTROINTESTINAL NEGATIVE: 0
PSYCHIATRIC NEGATIVE: 0
CARDIOVASCULAR NEGATIVE: 0
HEMATOLOGIC/LYMPHATIC NEGATIVE: 0
ALLERGIC/IMMUNOLOGIC NEGATIVE: 0
EYES NEGATIVE: 1
ENDOCRINE NEGATIVE: 0
RESPIRATORY NEGATIVE: 0
MUSCULOSKELETAL NEGATIVE: 0

## 2025-04-29 ASSESSMENT — VISUAL ACUITY
OS_SC: 20/20
METHOD: SNELLEN - LINEAR
OD_SC: 20/25

## 2025-04-29 ASSESSMENT — CUP TO DISC RATIO
OD_RATIO: 0.25
OS_RATIO: 0.25

## 2025-04-29 ASSESSMENT — EXTERNAL EXAM - LEFT EYE: OS_EXAM: NORMAL

## 2025-04-29 ASSESSMENT — SLIT LAMP EXAM - LIDS: COMMENTS: GOOD POSITION

## 2025-04-29 ASSESSMENT — TONOMETRY
OS_IOP_MMHG: 14
OD_IOP_MMHG: 13
IOP_METHOD: GOLDMANN APPLANATION

## 2025-04-29 ASSESSMENT — EXTERNAL EXAM - RIGHT EYE: OD_EXAM: NORMAL

## 2025-04-29 NOTE — Clinical Note
Formal visual field testing remains normal, without evidence of visual field loss of neurologic origin in setting of previous hx of pituitary adenoma. Will continue w/ annual monitoring.

## 2025-04-29 NOTE — PROGRESS NOTES
Assessment/Plan   Diagnoses and all orders for this visit:  Presbyopia  Hypermetropia of both eyes  Pt consents to receiving glasses Rx today. Patient's/guardian's signature obtained to acknowledge and confirm that a paper copy of glasses Rx was given to patient in compliance with [facility] Eyeglass Rule. Electronic copy of Rx will also be available via [employer].   Spec Rx released. Optional to fill as patient not currently having any difficulty. Ok to use OTC readers as desired. Ocular health WNL for age OU. Monitor 1 year. Refraction billed today.    Pituitary adenoma (Multi)  -     Russell Visual Field - OU - Both Eyes  Visual field full. There is no evidence of deficit of neurologic origin in setting of hx of pituitary adenoma. Will monitor.

## 2025-05-01 ENCOUNTER — APPOINTMENT (OUTPATIENT)
Dept: BEHAVIORAL HEALTH | Facility: CLINIC | Age: 60
End: 2025-05-01
Payer: COMMERCIAL

## 2025-05-01 DIAGNOSIS — F42.8 OTHER OBSESSIVE-COMPULSIVE DISORDERS: ICD-10-CM

## 2025-05-01 DIAGNOSIS — F33.1 MODERATE EPISODE OF RECURRENT MAJOR DEPRESSIVE DISORDER: ICD-10-CM

## 2025-05-01 DIAGNOSIS — F41.8 ANXIETY WITH SOMATIC FEATURES: ICD-10-CM

## 2025-05-01 DIAGNOSIS — G47.09 OTHER INSOMNIA: ICD-10-CM

## 2025-05-01 PROCEDURE — 99214 OFFICE O/P EST MOD 30 MIN: CPT | Performed by: CLINICAL NURSE SPECIALIST

## 2025-05-01 NOTE — ASSESSMENT & PLAN NOTE
Tolerating and benefiting from current regimen. No indication for medication changes today.      Continue sertraline as noted above.  Continue lamotrigine 200 mg PO daily- no refill needed today.       Advised of potentially fatal rash (i.e. Saucedo-Julio Cesar Syndrome) in 1:10,000 individuals. Agrees to stop medication and seek medical attention immediately if rash or blistering of the mucosal surfaces develops; also discussed risk for benign drug-induced rash, and advised to cease medication and seek immediate medical attention to r/o SJS given its potential lethality. Further advised that the risk for this reaction increases if proper dose titration is not followed, and that after 2 days of non-adherence, re-titration is necessary.

## 2025-05-01 NOTE — ASSESSMENT & PLAN NOTE
Tolerating and benefiting from current regimen. No indication for medication changes today.      Continue buspirone 5 mg PO BID- no refill needed today.    Continue lorazepam 0.5 mg PO daily PRN (#10 tabs per rx)- last fill 3/9/2025, no refill needed today.   Continue sertraline 200 mg PO daily- no refill needed today.    Not currently interested in individual therapy. Aware that I can assist with referrals.     Reviewed [place]ARRS, no discrepancies or concerns. Discussed risk of motor/cognitive impairment, sedation, dependence, tolerance, abuse, withdrawal sequelae, accidental overdose, life-threatening respiratory depression (leroy. in combination with alcohol and/or opioids), excess sedation in combination with other sedating medicines.

## 2025-05-01 NOTE — PROGRESS NOTES
Outpatient Psychiatry      Subjective   Rachel Gomez, is a 59 y.o. female,  seen in follow-up.        Assessment/Plan   Problem List Items Addressed This Visit           ICD-10-CM    Anxiety with somatic features F41.8    Tolerating and benefiting from current regimen. No indication for medication changes today.      Continue buspirone 5 mg PO BID- no refill needed today.    Continue lorazepam 0.5 mg PO daily PRN (#10 tabs per rx)- last fill 3/9/2025, no refill needed today.   Continue sertraline 200 mg PO daily- no refill needed today.    Not currently interested in individual therapy. Aware that I can assist with referrals.     Reviewed OARRS, no discrepancies or concerns. Discussed risk of motor/cognitive impairment, sedation, dependence, tolerance, abuse, withdrawal sequelae, accidental overdose, life-threatening respiratory depression (leroy. in combination with alcohol and/or opioids), excess sedation in combination with other sedating medicines.           Relevant Orders    Follow Up In Psychiatry    Moderate episode of recurrent major depressive disorder F33.1    Tolerating and benefiting from current regimen. No indication for medication changes today.      Continue sertraline as noted above.  Continue lamotrigine 200 mg PO daily- no refill needed today.       Advised of potentially fatal rash (i.e. Saucedo-Julio Cesar Syndrome) in 1:10,000 individuals. Agrees to stop medication and seek medical attention immediately if rash or blistering of the mucosal surfaces develops; also discussed risk for benign drug-induced rash, and advised to cease medication and seek immediate medical attention to r/o SJS given its potential lethality. Further advised that the risk for this reaction increases if proper dose titration is not followed, and that after 2 days of non-adherence, re-titration is necessary.          Relevant Orders    Follow Up In Psychiatry    Obsessive-compulsive disorder F42.9    Plan as noted above.  "         Relevant Orders    Follow Up In Psychiatry    Other insomnia G47.09    Resolved- no longer taking doxepin PRN for sleep.          Relevant Orders    Follow Up In Psychiatry       Follow-up in 6-8 weeks.     Risk Assessment:  Risk Assessment: Rachel Gomez is currently a low acute risk of suicide and self-harm due to no past suicide attempt(s) and not currently endorsing thoughts of suicide. Rachel Gomez is currently a low acute risk of violence and harm to others due to no past history of violence and not currently threatening others.  Suicidal Risk Factors:  and current psychiatric illness  Violence Risk Factors: none  Protective Factors: strong coping skills, sense of responsibility towards family, social support/connectedness, moral objections to suicide, positive family relationships, hopefulness/future orientation, and marriage/partnership      Reason for Visit:  Follow-up for medication management.     HPI:  Since her last visit,     Things are pretty good.   Daughter is moving out this weekend.   Other daughter got a full time job, and is moving into a new apartment- in a much better financial situation now.   Son will be moving back in with them next month, and is working full time.     Getting outside more for walks.   Feeling more interested in doing things.   Read a book that really helped shift her thinking about how to approach certain things, especially relationships with others.    Has noticed since friend's death last year a heightened awareness of her own mortality.   Sometimes experiencing intrusive thoughts r/t this- while on vacation, had the thought, \"I wonder if this is the last time I'll visit this place,\" or while shopping for new furniture thought, \"the stuff we have now should last until we [she and ] are gone.\"   Doesn't like to think that way, trying to identify strategies to reframe or challenge some of it.     Mood has been pretty good.   Staying in " "touch with friends, getting together regularly.   Enjoying things she'd expect to, maintaining interest in hobbies.    Denies suicidal ideation or a passive death wish.     Still having more pain in R hand since PRP injections. Migraines tend to be worse this time of year. Otherwise, no new medications or health problems.       Current Psychiatric Medications:  Lamotrigine 200 mg PO daily  Sertraline 200 mg PO daily  Buspirone 5 mg PO BID   Lorazepam 0.5 mg PO PRN for anxiety- has taken a few times (~5 tabs)      Record Review: brief     Medical Review Of Systems:  Pertinent items are noted in HPI.    Psychiatric Review Of Systems:  As noted in HPI.        Objective   Mental Status Exam  General Appearance: Well groomed, appropriate eye contact  Attitude/Behavior: Cooperative  Motor: No psychomotor agitation or retardation, no tremor or other abnormal movements  Speech: Normal rate, volume, prosody  Mood: \"Pretty good.\"  Affect: Congruent with mood and topic of conversation  Thought Process: Linear, goal directed  Thought Associations: No loosening of associations  Thought Content: Normal, Other: (comment) (some intrusive thoughts recently r/t mortality, associates to some extent with grief over friend's death last year.)  Perception: No perceptual abnormalities noted  Sensorium: Alert and oriented to person, place, time and situation  Insight: Intact  Judgement: Intact  Cognition: Cognitively intact to conversational testing with respect to attention, orientation, fund of knowledge, recent and remote memory, and language    Vitals:  There were no vitals filed for this visit.    Labs:  not applicable        Dominique Hoang, APRN-CNP, APRN-CNS  "

## 2025-05-08 DIAGNOSIS — N31.8 HYPERACTIVITY OF BLADDER: ICD-10-CM

## 2025-05-08 RX ORDER — MIRABEGRON 25 MG/1
25 TABLET, FILM COATED, EXTENDED RELEASE ORAL DAILY
Qty: 90 TABLET | Refills: 0 | Status: SHIPPED | OUTPATIENT
Start: 2025-05-08

## 2025-05-09 ENCOUNTER — SPECIALTY PHARMACY (OUTPATIENT)
Dept: PHARMACY | Facility: CLINIC | Age: 60
End: 2025-05-09

## 2025-05-09 PROCEDURE — RXMED WILLOW AMBULATORY MEDICATION CHARGE

## 2025-05-14 ENCOUNTER — PHARMACY VISIT (OUTPATIENT)
Dept: PHARMACY | Facility: CLINIC | Age: 60
End: 2025-05-14
Payer: COMMERCIAL

## 2025-05-29 DIAGNOSIS — E78.5 HYPERLIPIDEMIA, UNSPECIFIED: ICD-10-CM

## 2025-05-29 RX ORDER — ATORVASTATIN CALCIUM 40 MG/1
40 TABLET, FILM COATED ORAL NIGHTLY
Qty: 90 TABLET | Refills: 3 | Status: SHIPPED | OUTPATIENT
Start: 2025-05-29

## 2025-06-04 ENCOUNTER — APPOINTMENT (OUTPATIENT)
Dept: NEUROLOGY | Facility: CLINIC | Age: 60
End: 2025-06-04
Payer: COMMERCIAL

## 2025-06-04 VITALS
TEMPERATURE: 97 F | DIASTOLIC BLOOD PRESSURE: 77 MMHG | BODY MASS INDEX: 37.83 KG/M2 | WEIGHT: 241 LBS | RESPIRATION RATE: 11 BRPM | HEIGHT: 67 IN | HEART RATE: 67 BPM | SYSTOLIC BLOOD PRESSURE: 127 MMHG

## 2025-06-04 DIAGNOSIS — R25.1 TREMOR: ICD-10-CM

## 2025-06-04 DIAGNOSIS — G43.719 INTRACTABLE CHRONIC MIGRAINE WITHOUT AURA AND WITHOUT STATUS MIGRAINOSUS: Primary | ICD-10-CM

## 2025-06-04 PROCEDURE — 1036F TOBACCO NON-USER: CPT | Performed by: PSYCHIATRY & NEUROLOGY

## 2025-06-04 PROCEDURE — 3078F DIAST BP <80 MM HG: CPT | Performed by: PSYCHIATRY & NEUROLOGY

## 2025-06-04 PROCEDURE — 3074F SYST BP LT 130 MM HG: CPT | Performed by: PSYCHIATRY & NEUROLOGY

## 2025-06-04 PROCEDURE — 3008F BODY MASS INDEX DOCD: CPT | Performed by: PSYCHIATRY & NEUROLOGY

## 2025-06-04 PROCEDURE — G8433 SCR FOR DEP NOT CPT DOC RSN: HCPCS | Performed by: PSYCHIATRY & NEUROLOGY

## 2025-06-04 PROCEDURE — 99214 OFFICE O/P EST MOD 30 MIN: CPT | Performed by: PSYCHIATRY & NEUROLOGY

## 2025-06-04 RX ORDER — PROPRANOLOL HYDROCHLORIDE 60 MG/1
60 TABLET ORAL DAILY
Qty: 135 TABLET | Refills: 3 | Status: SHIPPED | OUTPATIENT
Start: 2025-06-04

## 2025-06-04 RX ORDER — PROPRANOLOL HYDROCHLORIDE 60 MG/1
60 TABLET ORAL DAILY
Qty: 135 TABLET | Refills: 3 | Status: SHIPPED | OUTPATIENT
Start: 2025-06-04 | End: 2025-06-04

## 2025-06-04 ASSESSMENT — PAIN SCALES - GENERAL: PAINLEVEL_OUTOF10: 0-NO PAIN

## 2025-06-04 ASSESSMENT — ENCOUNTER SYMPTOMS
LOSS OF SENSATION IN FEET: 0
OCCASIONAL FEELINGS OF UNSTEADINESS: 0
DEPRESSION: 0

## 2025-06-04 NOTE — PROGRESS NOTES
HPI:    57 y/o F here for follow up for migraine headaches and tremor.  Patient was last seen in December.    Migraines were okay, not frequent during winter months.  She is happy with Emgality response. No AE.     Her headaches typically worsen with weather changes and certain dietary changes.   Spring and Fall are worse.      Botox typically in the past lasts 2 1/2 months, only had a couple headaches during that time, the past 2 weeks has had more HAs, stronger. she is happy with the response.      used to be followed by Sarahy Morrison (NP) and Dr. Castro at [facility]     She did PRP once in hands and she said it was the most painful thing for her.     hx prolactinoma s/p TSS 10 years ago at [facility], sees endocrine.     Onset of headaches: 40+ years  Frequency of headaches (days per month): without botox - usually 20 days/ month  Duration of headaches (average): All day long.  Severity of headaches (out of 10): 5-7/10  Aura: none , wakes up with HA  Nausea/vomiting: Yes - nausea  Photophobia: Yes  Phonophobia: Yes     Location: Across forehead to temple area.     triggers:Weather changes, foods, not eating correctly     brain imaging: MRI performed regularly ([facility]) due to hx of pituitary tumor/removed surgically 2010 at [facility]. - her endocrinologist just ordered MRI sella.      Preventative medications:   Topiramate  Zonisamide  Buproprion  ubrelvy  emgality       Botox (4 rounds)- in 2018 with 200 units, last one being 12/4/2018- stopped due to insurance.        Abortive medications: Dexamethasone 4 mg (during a cycle)    Zomig nasal spray.- this is only one that worked for her. But not covered with insurance.    Rizatriptan.   Ubrelvy      Past Medical History:   Diagnosis Date    Bipolar II disorder (Multi) 02/01/2017    Bipolar 2 disorder    Encounter for general adult medical examination without abnormal findings 10/19/2014    Preventative health care    Major depressive disorder, recurrent, in partial remission  02/17/2021    Major depressive disorder, recurrent episode, in partial remission with anxious distress    Major depressive disorder, recurrent, mild 02/01/2017    Major depressive disorder, recurrent episode, mild with anxious distress    Personal history of other mental and behavioral disorders 02/03/2017    History of depression    Personal history of other mental and behavioral disorders 02/01/2017    History of bipolar disorder    Personal history of other specified conditions     History of headache   Constitutional: General appearance: no acute distress. Pleasant.   Auscultation of Heart: Regular rate and rhythm, no murmurs, normal S1 and S2.   Carotid Arteries: Intact without any bruits   Peripheral Vascular Exam: Pulses +2 and equal in all extremities. No swelling, edema or tenderness to palpations   Mental status: The patient was in no distress, alert, interactive and cooperative. Affect is appropriate.   Orientation: oriented to person, oriented to place and oriented to time.   Memory: recent memory intact and remote memory intact.   Attention: normal attention span and normal concentrating ability.   Language: normal comprehension and no difficulty naming common objects.   Fund of knowledge: Patient displays adequate knowledge of current events, adequate fund of knowledge and fund of knowledge .   Eyes: The ophthalmoscopic examination was normal. The fundi are visualized with normal disc margins and without hemorrhages   Cranial nerve II: Visual fields full to confrontation.   Cranial nerves III, IV, and VI: Pupils round, equally reactive to light; no ptosis. EOMs intact. No nystagmus.   Cranial Nerve V: Facial sensation intact bilaterally.   Cranial nerve VII: Normal and symmetric facial strength.   Cranial nerve VIII: Hearing is intact bilaterally to finger rub / whisper.   Cranial nerves IX and X: Palate elevates symmetrically.   Cranial nerve XI: Shoulder shrug and neck rotation strength are intact.    Cranial nerve XII: Tongue midline with normal strength.   Motor:  Muscle bulk was normal in both upper and lower extremities.    No atrophy.   Strength is normal   Deep Tendon Reflexes: left biceps 2+ , right biceps 2+, left triceps 2+, right triceps 2+, left brachioradialis 2+, right brachioradialis 2+, left patella 2+, right patella 2+, left ankle jerk 2+, right ankle jerk 2+   Plantar Reflex: Toes downgoing to plantar stimulation on the left. Toes downgoing to plantar stimulation on the right.   Sensory Exam: Normal to light touch.   Coordination: limb dystaxia   Gait:  cautious.        Assessment      Migraine - still pretty frequent HA, more than 1/2 the month of headaches.  Continue emgality as helping.   Zomig nasal spray- has been only the medication abortive that has helped her.   Tremor -Try Propranolol 60mg 1 tab in morning and 1/2 tab 6 hours later.   Try nurtec . Has been on several statin and ubrelvy and did not work.

## 2025-06-04 NOTE — PATIENT INSTRUCTIONS
"It was a pleasure seeing you today.     Try Propranolol 60mg 1 tab in morning and 1/2 tab 6 hours later.   I recommend you taking in the morning and then 6 hours later (midday) for best effect during the day when you are awake.  ( main potential adverse effect is a slight drop in Blood pressure and Heart rate/ lightheadedness-  but pretty uncommon on low doses)      Please make a follow up appointment in 6 months.  You may also schedule a phone or virtual visit sooner on a Friday morning with me as needed before the next clinic appointment.     For any urgent issues or needing to speak to a medical assistant please call [phone], option 6 during our office hours Monday-Friday 8am-4pm, and leave a voicemail with your concern.  My office will try to reach back you as soon as possible within 24 (business) hours.  If you have an emergency please call 911 or visit a local urgent care or nearest emergency room.      Please understand that ShopItToMe is a useful communication tool for simple \"normal\" results or a refill request but I would not recommend using this tool for emergent or urgent issues or for conversations with me.  I am happy to ask my staff to rearrange a follow up visit or a virtual visit sooner than requested if appropriate for your care.    "

## 2025-06-15 NOTE — PROGRESS NOTES
[facility]  Hand and Upper Extremity Service  Follow up visit         Follow up for: Bilateral thumbs     Interval History: She returns for her bilateral thumbs. She received bilateral thumb CMC injections at last visit. She had PRP injections in February with Dr. Plunkett and did not tolerate those injections very well and thinks those symptoms caused more exacerbation of pain, especially on her right hand. She wants to try injections again today to get her through the summer but also thinks she is ready for consideration of thumb CMC joint reconstructions.               Past medical history, medications, allergies, surgical history and review of systems are reviewed and otherwise unchanged when compared to last visit on 8/6/24         Examination:  Constitutional: Oriented to person, place, and time.  Appears well-developed and well-nourished.  Head: Normocephalic and atraumatic.  Eyes: Pupils are equal, round, and reactive to light.  Cardiovascular: Intact distal pulses.  Pulmonary/Chest/Breast: Effort normal. No respiratory distress.  Neurological: Alert and oriented to person, place, and time.  Skin: Skin is warm and dry.  Psychiatric: normal mood and affect.  Behavior is normal.  Musculoskeletal: Bilateral hands reveal healed surgical incision over the left thumb CMC joint from prior arthrotomy and debridement without reconstruction. Markedly positive bilateral thumb CMC grind test with crepitus and instability. MP joints are stable bilaterally. No thenar atrophy. Sensation intact to light touch. Significant weakness with attempted pinch due to pain.       Personal Interpretation of Diagnostic studies: No new images obtained today. Review of x-rays of right thumb from 2/20 demonstrate joint space narrowing and subluxation of the thumb CMC joint.        Impression: Bilateral thumb CMC arthritis with instability       Plan: We had a long discussion today regarding surgery of  thumb CMC joint reconstruction and the anticipated recovery following surgery. She thinks she perhaps wants to get something set up for October after she has completed her summer plans. We provided her bilateral thumb CMC joint injections today and she will call to schedule right thumb CMC joint arthroplasty this fall.       In Office Procedures Performed: Bilateral thumb CMC injections  S Inj/Asp: bilateral thumb CMC on 6/17/2025 2:40 PM  Indications: pain  Details: 25 G needle, dorsal approach  Medications (Right): 20 mg triamcinolone acetonide 40 mg/mL; 0.5 mL lidocaine 10 mg/mL (1 %)  Medications (Left): 20 mg triamcinolone acetonide 40 mg/mL; 0.5 mL lidocaine 10 mg/mL (1 %)  Outcome: tolerated well, no immediate complications  Procedure, treatment alternatives, risks and benefits explained, specific risks discussed. Consent was given by the patient. Immediately prior to procedure a time out was called to verify the correct patient, procedure, equipment, support staff and site/side marked as required. Patient was prepped and draped in the usual sterile fashion.       Follow up: Will call to schedule surgery        We discussed risks, benefits, alternatives and anticipated post op course including time away from work and activities following surgical treatment for the patient's condition. This is major surgery with identifiable risks. No guarantees for success have been provided. The patient has expressed understanding and has elected to pursue operative treatment.        For Surgical Planning:  Diagnosis: Right thumb arthritis  Procedure: Right thumb CMC arthroplasty with FCR tendon transfer  CPT: 16401  Anesthesia: General With preoperative regional block  Duration: 1 hour  Special Equipment Needed: None  Medical Notes / PM / DM / PAT needed?:  PAT requested  Location: Any  Initial Post Operative Visit: 2 weeks          Sammy Felix MD  [facility]  Department of  Orthopaedic Surgery  Hand and Upper Extremity Reconstruction    Scribe Attestation  By signing my name below, I, Kaye Granados   attest that this documentation has been prepared under the direction and in the presence of Dr. Sammy Felix.    Dictation performed with the use of voice recognition software.  Syntax and grammatical errors may exist.

## 2025-06-17 ENCOUNTER — OFFICE VISIT (OUTPATIENT)
Dept: ORTHOPEDIC SURGERY | Facility: HOSPITAL | Age: 60
End: 2025-06-17
Payer: COMMERCIAL

## 2025-06-17 VITALS — BODY MASS INDEX: 37.83 KG/M2 | WEIGHT: 241 LBS | HEIGHT: 67 IN

## 2025-06-17 DIAGNOSIS — M18.0 ARTHRITIS OF CARPOMETACARPAL (CMC) JOINT OF BOTH THUMBS: Primary | ICD-10-CM

## 2025-06-17 PROCEDURE — 99212 OFFICE O/P EST SF 10 MIN: CPT | Performed by: ORTHOPAEDIC SURGERY

## 2025-06-17 PROCEDURE — 3008F BODY MASS INDEX DOCD: CPT | Performed by: ORTHOPAEDIC SURGERY

## 2025-06-17 PROCEDURE — 99214 OFFICE O/P EST MOD 30 MIN: CPT | Performed by: ORTHOPAEDIC SURGERY

## 2025-06-17 PROCEDURE — 2500000004 HC RX 250 GENERAL PHARMACY W/ HCPCS (ALT 636 FOR OP/ED): Performed by: ORTHOPAEDIC SURGERY

## 2025-06-17 PROCEDURE — 20600 DRAIN/INJ JOINT/BURSA W/O US: CPT | Mod: 50 | Performed by: ORTHOPAEDIC SURGERY

## 2025-06-17 PROCEDURE — 1036F TOBACCO NON-USER: CPT | Performed by: ORTHOPAEDIC SURGERY

## 2025-06-17 RX ORDER — LIDOCAINE HYDROCHLORIDE 10 MG/ML
0.5 INJECTION, SOLUTION INFILTRATION; PERINEURAL
Status: COMPLETED | OUTPATIENT
Start: 2025-06-17 | End: 2025-06-17

## 2025-06-17 RX ORDER — TRIAMCINOLONE ACETONIDE 40 MG/ML
20 INJECTION, SUSPENSION INTRA-ARTICULAR; INTRAMUSCULAR
Status: COMPLETED | OUTPATIENT
Start: 2025-06-17 | End: 2025-06-17

## 2025-06-17 RX ADMIN — LIDOCAINE HYDROCHLORIDE 0.5 ML: 10 INJECTION, SOLUTION INFILTRATION; PERINEURAL at 14:40

## 2025-06-17 RX ADMIN — TRIAMCINOLONE ACETONIDE 20 MG: 40 INJECTION, SUSPENSION INTRA-ARTICULAR; INTRAMUSCULAR at 14:40

## 2025-06-24 DIAGNOSIS — F41.8 ANXIETY WITH SOMATIC FEATURES: ICD-10-CM

## 2025-06-24 RX ORDER — BUSPIRONE HYDROCHLORIDE 5 MG/1
5 TABLET ORAL 2 TIMES DAILY
Qty: 180 TABLET | Refills: 0 | Status: SHIPPED | OUTPATIENT
Start: 2025-06-24 | End: 2025-09-22

## 2025-07-06 DIAGNOSIS — M19.90 UNSPECIFIED OSTEOARTHRITIS, UNSPECIFIED SITE: ICD-10-CM

## 2025-07-07 RX ORDER — MELOXICAM 15 MG/1
15 TABLET ORAL DAILY PRN
Qty: 90 TABLET | Refills: 0 | Status: SHIPPED | OUTPATIENT
Start: 2025-07-07

## 2025-07-09 ENCOUNTER — APPOINTMENT (OUTPATIENT)
Dept: BEHAVIORAL HEALTH | Facility: CLINIC | Age: 60
End: 2025-07-09
Payer: COMMERCIAL

## 2025-07-09 DIAGNOSIS — F41.8 ANXIETY WITH SOMATIC FEATURES: ICD-10-CM

## 2025-07-09 DIAGNOSIS — F33.1 MODERATE EPISODE OF RECURRENT MAJOR DEPRESSIVE DISORDER: ICD-10-CM

## 2025-07-09 DIAGNOSIS — F42.8 OTHER OBSESSIVE-COMPULSIVE DISORDERS: ICD-10-CM

## 2025-07-09 PROCEDURE — 90833 PSYTX W PT W E/M 30 MIN: CPT | Performed by: CLINICAL NURSE SPECIALIST

## 2025-07-09 PROCEDURE — 99213 OFFICE O/P EST LOW 20 MIN: CPT | Performed by: CLINICAL NURSE SPECIALIST

## 2025-07-09 PROCEDURE — 1036F TOBACCO NON-USER: CPT | Performed by: CLINICAL NURSE SPECIALIST

## 2025-07-09 NOTE — PROGRESS NOTES
Outpatient Psychiatry      Subjective   Rachel Gomez, is a 59 y.o. female,  seen in follow-up.      Assessment/Plan   Problem List Items Addressed This Visit           ICD-10-CM    Anxiety with somatic features F41.8    Tolerating and benefiting from current regimen. Some recent exacerbation of anxious sx in response to psychosocial stressors. No indication for medication changes today.      Continue buspirone 5 mg PO BID- no refill needed today.    Continue lorazepam 0.5 mg PO daily PRN (#10 tabs per rx)- last fill 3/9/2025, no refill needed today. Taking sparingly.   Continue sertraline 200 mg PO daily- no refill needed today.    Not currently interested in individual therapy. Aware that I can assist with referrals.     Reviewed OARRS, no discrepancies or concerns. Discussed risk of motor/cognitive impairment, sedation, dependence, tolerance, abuse, withdrawal sequelae, accidental overdose, life-threatening respiratory depression (leroy. in combination with alcohol and/or opioids), excess sedation in combination with other sedating medicines.           Relevant Orders    Follow Up In Psychiatry    Moderate episode of recurrent major depressive disorder F33.1    Tolerating and benefiting from current regimen. As noted above, some recent sx exacerbation in response to psychosocial stressors. No indication for medication changes today.      Continue sertraline as noted above.  Continue lamotrigine 200 mg PO daily- no refill needed today.       Advised of potentially fatal rash (i.e. Saucedo-Julio Cesar Syndrome) in 1:10,000 individuals. Agrees to stop medication and seek medical attention immediately if rash or blistering of the mucosal surfaces develops; also discussed risk for benign drug-induced rash, and advised to cease medication and seek immediate medical attention to r/o SJS given its potential lethality. Further advised that the risk for this reaction increases if proper dose titration is not followed, and  that after 2 days of non-adherence, re-titration is necessary.          Relevant Orders    Follow Up In Psychiatry    Obsessive-compulsive disorder F42.9    Plan as noted above.          Relevant Orders    Follow Up In Psychiatry       Follow-up in 4-6 weeks.     Risk Assessment:  Risk Assessment: Rachel Gomez is currently a low acute risk of suicide and self-harm due to no past suicide attempt(s) and not currently endorsing thoughts of suicide. Rachel Gomez is currently a low acute risk of violence and harm to others due to no past history of violence and not currently threatening others.  Suicidal Risk Factors:  and current psychiatric illness  Violence Risk Factors: none  Protective Factors: strong coping skills, sense of responsibility towards family, social support/connectedness, moral objections to suicide, positive family relationships, hopefulness/future orientation, and marriage/partnership    Reason for Visit:  Follow-up for medication management.     HPI:  Since her last visit,     Things have been about the same.  Not going well with son living back at home.  Isn't contributing at all around the house, not respecting rules they've asked him to.   Minimizes her concerns when she brings them up, and often becomes condescending when she pushes for healthier boundaries.     More anxious recently.   A lot of associated anger with it.   Tends to exacerbate physical sx (e.g. diarrhea/stomach upset, headaches, shaking), and that leads to more isolating.     Has been enjoying the nicer weather.   Able to sit outside on her patio and get fresh air, and feels like that gives her a little escape, peace of mind.     Taking lorazepam PRN at times she's feeling more anxious/panicky.  Seems to take the edge off- doesn't usually take the anxiety completely away, but enough that she notices improvement.     Denies suicidal ideation or a passive death wish.     No new medications or health problems.  "      Current Psychiatric Medications:  Lamotrigine 200 mg PO daily  Sertraline 200 mg PO daily  Buspirone 5 mg PO BID   Lorazepam 0.5 mg PO PRN for anxiety- has taken a few times (~5 tabs)      Record Review: brief     Medical Review Of Systems:  Pertinent items are noted in HPI.    Psychiatric Review Of Systems:  As noted in HPI.        Objective   Mental Status Exam  General Appearance: Well groomed, appropriate eye contact  Attitude/Behavior: Cooperative  Motor: No psychomotor agitation or retardation, no tremor or other abnormal movements  Speech: Normal rate, volume, prosody  Mood: \"Not great.\"  Affect: Congruent with mood and topic of conversation  Thought Process: Linear, goal directed  Thought Associations: No loosening of associations  Thought Content: Other: (comment) (anxious themes, frustration over dynamics with son.)  Perception: No perceptual abnormalities noted  Sensorium: Alert and oriented to person, place, time and situation  Insight: Intact  Judgement: Intact  Cognition: Cognitively intact to conversational testing with respect to attention, orientation, fund of knowledge, recent and remote memory, and language    Vitals:  There were no vitals filed for this visit.    Labs:  not applicable    Psychotherapy:  Time spent in therapy 20 min.  Summary of Psychotherapy component: Identified control fallacy and \"should\" statements, and discussed resetting boundaries with son as a strategy to challenge these cognitive distortions and the associated distress. Identified 2 potential strategies to attempt- limiting text messages and speaking face to face instead, and no longer doing additional chores (laundry, purchasing special foods) that son could be doing.       Dominique Hoang, APRN-CNP, APRN-CNS  "

## 2025-07-09 NOTE — ASSESSMENT & PLAN NOTE
Tolerating and benefiting from current regimen. Some recent exacerbation of anxious sx in response to psychosocial stressors. No indication for medication changes today.      Continue buspirone 5 mg PO BID- no refill needed today.    Continue lorazepam 0.5 mg PO daily PRN (#10 tabs per rx)- last fill 3/9/2025, no refill needed today. Taking sparingly.   Continue sertraline 200 mg PO daily- no refill needed today.    Not currently interested in individual therapy. Aware that I can assist with referrals.     Reviewed OARRS, no discrepancies or concerns. Discussed risk of motor/cognitive impairment, sedation, dependence, tolerance, abuse, withdrawal sequelae, accidental overdose, life-threatening respiratory depression (leroy. in combination with alcohol and/or opioids), excess sedation in combination with other sedating medicines.

## 2025-07-09 NOTE — ASSESSMENT & PLAN NOTE
Tolerating and benefiting from current regimen. As noted above, some recent sx exacerbation in response to psychosocial stressors. No indication for medication changes today.      Continue sertraline as noted above.  Continue lamotrigine 200 mg PO daily- no refill needed today.       Advised of potentially fatal rash (i.e. Saucedo-Julio Cesar Syndrome) in 1:10,000 individuals. Agrees to stop medication and seek medical attention immediately if rash or blistering of the mucosal surfaces develops; also discussed risk for benign drug-induced rash, and advised to cease medication and seek immediate medical attention to r/o SJS given its potential lethality. Further advised that the risk for this reaction increases if proper dose titration is not followed, and that after 2 days of non-adherence, re-titration is necessary.

## 2025-07-10 ENCOUNTER — APPOINTMENT (OUTPATIENT)
Facility: CLINIC | Age: 60
End: 2025-07-10
Payer: COMMERCIAL

## 2025-07-18 ENCOUNTER — TELEPHONE (OUTPATIENT)
Facility: CLINIC | Age: 60
End: 2025-07-18
Payer: COMMERCIAL

## 2025-07-18 NOTE — TELEPHONE ENCOUNTER
Patient would like to know if she can get a steroid pack as she has been struggling all week with a migraine. I advised her that you were already gone for the day and this may not be addressed until Monday.

## 2025-07-21 ENCOUNTER — SPECIALTY PHARMACY (OUTPATIENT)
Dept: PHARMACY | Facility: CLINIC | Age: 60
End: 2025-07-21

## 2025-07-21 DIAGNOSIS — G43.719 INTRACTABLE CHRONIC MIGRAINE WITHOUT AURA AND WITHOUT STATUS MIGRAINOSUS: Primary | ICD-10-CM

## 2025-07-21 RX ORDER — METHYLPREDNISOLONE 4 MG/1
TABLET ORAL
Qty: 21 TABLET | Refills: 0 | Status: SHIPPED | OUTPATIENT
Start: 2025-07-21

## 2025-07-22 ENCOUNTER — APPOINTMENT (OUTPATIENT)
Facility: CLINIC | Age: 60
End: 2025-07-22
Payer: COMMERCIAL

## 2025-08-04 DIAGNOSIS — M54.2 NECK PAIN: Primary | ICD-10-CM

## 2025-08-05 ENCOUNTER — TELEPHONE (OUTPATIENT)
Dept: NEUROLOGY | Facility: CLINIC | Age: 60
End: 2025-08-05
Payer: COMMERCIAL

## 2025-08-05 DIAGNOSIS — G43.719 INTRACTABLE CHRONIC MIGRAINE WITHOUT AURA AND WITHOUT STATUS MIGRAINOSUS: Primary | ICD-10-CM

## 2025-08-05 RX ORDER — CYCLOBENZAPRINE HCL 5 MG
TABLET ORAL
Qty: 60 TABLET | Refills: 2 | Status: SHIPPED | OUTPATIENT
Start: 2025-08-05

## 2025-08-06 ENCOUNTER — PHARMACY VISIT (OUTPATIENT)
Dept: PHARMACY | Facility: CLINIC | Age: 60
End: 2025-08-06
Payer: COMMERCIAL

## 2025-08-06 PROCEDURE — RXMED WILLOW AMBULATORY MEDICATION CHARGE

## 2025-08-11 DIAGNOSIS — N31.8 HYPERACTIVITY OF BLADDER: ICD-10-CM

## 2025-08-11 RX ORDER — MIRABEGRON 25 MG/1
25 TABLET, FILM COATED, EXTENDED RELEASE ORAL DAILY
Qty: 90 TABLET | Refills: 0 | Status: SHIPPED | OUTPATIENT
Start: 2025-08-11

## 2025-08-18 ENCOUNTER — HOSPITAL ENCOUNTER (OUTPATIENT)
Dept: RADIOLOGY | Facility: CLINIC | Age: 60
Discharge: HOME | End: 2025-08-18
Payer: COMMERCIAL

## 2025-08-18 ENCOUNTER — APPOINTMENT (OUTPATIENT)
Dept: RHEUMATOLOGY | Facility: CLINIC | Age: 60
End: 2025-08-18
Payer: COMMERCIAL

## 2025-08-18 VITALS
RESPIRATION RATE: 16 BRPM | HEART RATE: 68 BPM | OXYGEN SATURATION: 98 % | HEIGHT: 67 IN | BODY MASS INDEX: 37.83 KG/M2 | SYSTOLIC BLOOD PRESSURE: 116 MMHG | DIASTOLIC BLOOD PRESSURE: 70 MMHG | WEIGHT: 241 LBS

## 2025-08-18 DIAGNOSIS — G89.29 CHRONIC RIGHT-SIDED THORACIC BACK PAIN: ICD-10-CM

## 2025-08-18 DIAGNOSIS — M19.049 CMC ARTHRITIS: Primary | ICD-10-CM

## 2025-08-18 DIAGNOSIS — M54.6 CHRONIC RIGHT-SIDED THORACIC BACK PAIN: ICD-10-CM

## 2025-08-18 PROCEDURE — 72070 X-RAY EXAM THORAC SPINE 2VWS: CPT

## 2025-08-18 PROCEDURE — 3074F SYST BP LT 130 MM HG: CPT | Performed by: INTERNAL MEDICINE

## 2025-08-18 PROCEDURE — 99214 OFFICE O/P EST MOD 30 MIN: CPT | Performed by: INTERNAL MEDICINE

## 2025-08-18 PROCEDURE — 3078F DIAST BP <80 MM HG: CPT | Performed by: INTERNAL MEDICINE

## 2025-08-18 PROCEDURE — 1036F TOBACCO NON-USER: CPT | Performed by: INTERNAL MEDICINE

## 2025-08-18 PROCEDURE — 3008F BODY MASS INDEX DOCD: CPT | Performed by: INTERNAL MEDICINE

## 2025-08-18 ASSESSMENT — PAIN SCALES - GENERAL: PAINLEVEL_OUTOF10: 2

## 2025-08-19 LAB
ALBUMIN SERPL-MCNC: 4.4 G/DL (ref 3.6–5.1)
ALP SERPL-CCNC: 112 U/L (ref 37–153)
ALT SERPL-CCNC: 13 U/L (ref 6–29)
ANION GAP SERPL CALCULATED.4IONS-SCNC: 10 MMOL/L (CALC) (ref 7–17)
AST SERPL-CCNC: 15 U/L (ref 10–35)
BILIRUB SERPL-MCNC: 0.3 MG/DL (ref 0.2–1.2)
BUN SERPL-MCNC: 17 MG/DL (ref 7–25)
CALCIUM SERPL-MCNC: 9.5 MG/DL (ref 8.6–10.4)
CHLORIDE SERPL-SCNC: 105 MMOL/L (ref 98–110)
CO2 SERPL-SCNC: 25 MMOL/L (ref 20–32)
CREAT SERPL-MCNC: 0.88 MG/DL (ref 0.5–1.03)
EGFRCR SERPLBLD CKD-EPI 2021: 76 ML/MIN/1.73M2
GLUCOSE SERPL-MCNC: 100 MG/DL (ref 65–99)
POTASSIUM SERPL-SCNC: 4.2 MMOL/L (ref 3.5–5.3)
PROT SERPL-MCNC: 7 G/DL (ref 6.1–8.1)
SODIUM SERPL-SCNC: 140 MMOL/L (ref 135–146)

## 2025-08-27 ENCOUNTER — APPOINTMENT (OUTPATIENT)
Dept: BEHAVIORAL HEALTH | Facility: CLINIC | Age: 60
End: 2025-08-27
Payer: COMMERCIAL

## 2025-08-27 DIAGNOSIS — F33.1 MODERATE EPISODE OF RECURRENT MAJOR DEPRESSIVE DISORDER: ICD-10-CM

## 2025-08-27 DIAGNOSIS — F42.8 OTHER OBSESSIVE-COMPULSIVE DISORDERS: ICD-10-CM

## 2025-08-27 DIAGNOSIS — F41.8 ANXIETY WITH SOMATIC FEATURES: ICD-10-CM

## 2025-08-27 PROCEDURE — 99213 OFFICE O/P EST LOW 20 MIN: CPT | Performed by: CLINICAL NURSE SPECIALIST

## 2025-08-27 PROCEDURE — 1036F TOBACCO NON-USER: CPT | Performed by: CLINICAL NURSE SPECIALIST

## 2025-08-27 RX ORDER — BUSPIRONE HYDROCHLORIDE 5 MG/1
5 TABLET ORAL 2 TIMES DAILY
Qty: 180 TABLET | Refills: 1 | Status: SHIPPED | OUTPATIENT
Start: 2025-08-27 | End: 2026-02-23

## 2025-10-15 ENCOUNTER — APPOINTMENT (OUTPATIENT)
Dept: BEHAVIORAL HEALTH | Facility: CLINIC | Age: 60
End: 2025-10-15
Payer: COMMERCIAL

## 2025-11-25 ENCOUNTER — APPOINTMENT (OUTPATIENT)
Dept: ENDOCRINOLOGY | Facility: CLINIC | Age: 60
End: 2025-11-25
Payer: COMMERCIAL

## 2025-12-03 ENCOUNTER — APPOINTMENT (OUTPATIENT)
Dept: NEUROLOGY | Facility: CLINIC | Age: 60
End: 2025-12-03
Payer: COMMERCIAL

## 2026-02-18 ENCOUNTER — APPOINTMENT (OUTPATIENT)
Dept: PRIMARY CARE | Facility: CLINIC | Age: 61
End: 2026-02-18
Payer: COMMERCIAL

## 2026-04-07 ENCOUNTER — APPOINTMENT (OUTPATIENT)
Dept: ENDOCRINOLOGY | Facility: CLINIC | Age: 61
End: 2026-04-07
Payer: COMMERCIAL

## 2026-05-04 ENCOUNTER — APPOINTMENT (OUTPATIENT)
Dept: OPHTHALMOLOGY | Facility: CLINIC | Age: 61
End: 2026-05-04
Payer: COMMERCIAL